# Patient Record
Sex: MALE | Race: WHITE | NOT HISPANIC OR LATINO | Employment: OTHER | URBAN - NONMETROPOLITAN AREA
[De-identification: names, ages, dates, MRNs, and addresses within clinical notes are randomized per-mention and may not be internally consistent; named-entity substitution may affect disease eponyms.]

---

## 2017-01-20 ENCOUNTER — HOSPITAL ENCOUNTER (EMERGENCY)
Facility: HOSPITAL | Age: 52
Discharge: HOME | End: 2017-01-20
Attending: EMERGENCY MEDICINE | Admitting: EMERGENCY MEDICINE
Payer: MEDICARE

## 2017-01-20 VITALS
TEMPERATURE: 98.7 F | DIASTOLIC BLOOD PRESSURE: 83 MMHG | SYSTOLIC BLOOD PRESSURE: 131 MMHG | RESPIRATION RATE: 16 BRPM | OXYGEN SATURATION: 95 % | HEART RATE: 55 BPM

## 2017-01-20 DIAGNOSIS — R07.89 ATYPICAL CHEST PAIN: ICD-10-CM

## 2017-01-20 DIAGNOSIS — K21.00 GASTROESOPHAGEAL REFLUX DISEASE WITH ESOPHAGITIS: ICD-10-CM

## 2017-01-20 LAB
ALBUMIN SERPL-MCNC: 3.9 G/DL (ref 3.4–5)
ALP SERPL-CCNC: 86 U/L (ref 40–150)
ALT SERPL W P-5'-P-CCNC: 24 U/L (ref 0–70)
ANION GAP SERPL CALCULATED.3IONS-SCNC: 6 MMOL/L (ref 3–14)
AST SERPL W P-5'-P-CCNC: 18 U/L (ref 0–45)
BASOPHILS # BLD AUTO: 0.1 10E9/L (ref 0–0.2)
BASOPHILS NFR BLD AUTO: 0.6 %
BILIRUB SERPL-MCNC: 0.3 MG/DL (ref 0.2–1.3)
BUN SERPL-MCNC: 9 MG/DL (ref 7–30)
CALCIUM SERPL-MCNC: 9.3 MG/DL (ref 8.5–10.1)
CHLORIDE SERPL-SCNC: 108 MMOL/L (ref 94–109)
CK SERPL-CCNC: 108 U/L (ref 30–300)
CO2 SERPL-SCNC: 28 MMOL/L (ref 20–32)
CREAT SERPL-MCNC: 0.97 MG/DL (ref 0.66–1.25)
DIFFERENTIAL METHOD BLD: NORMAL
EOSINOPHIL # BLD AUTO: 0.1 10E9/L (ref 0–0.7)
EOSINOPHIL NFR BLD AUTO: 1.6 %
ERYTHROCYTE [DISTWIDTH] IN BLOOD BY AUTOMATED COUNT: 13.5 % (ref 10–15)
GFR SERPL CREATININE-BSD FRML MDRD: 82 ML/MIN/1.7M2
GLUCOSE SERPL-MCNC: 98 MG/DL (ref 70–99)
HCT VFR BLD AUTO: 46.6 % (ref 40–53)
HGB BLD-MCNC: 16.1 G/DL (ref 13.3–17.7)
IMM GRANULOCYTES # BLD: 0 10E9/L (ref 0–0.4)
IMM GRANULOCYTES NFR BLD: 0.3 %
LIPASE SERPL-CCNC: 107 U/L (ref 73–393)
LYMPHOCYTES # BLD AUTO: 2.8 10E9/L (ref 0.8–5.3)
LYMPHOCYTES NFR BLD AUTO: 30.9 %
MAGNESIUM SERPL-MCNC: 2.3 MG/DL (ref 1.6–2.3)
MCH RBC QN AUTO: 33 PG (ref 26.5–33)
MCHC RBC AUTO-ENTMCNC: 34.5 G/DL (ref 31.5–36.5)
MCV RBC AUTO: 96 FL (ref 78–100)
MONOCYTES # BLD AUTO: 0.7 10E9/L (ref 0–1.3)
MONOCYTES NFR BLD AUTO: 8 %
NEUTROPHILS # BLD AUTO: 5.3 10E9/L (ref 1.6–8.3)
NEUTROPHILS NFR BLD AUTO: 58.6 %
NRBC # BLD AUTO: 0 10*3/UL
NRBC BLD AUTO-RTO: 0 /100
PLATELET # BLD AUTO: 337 10E9/L (ref 150–450)
POTASSIUM SERPL-SCNC: 4 MMOL/L (ref 3.4–5.3)
PROT SERPL-MCNC: 7.4 G/DL (ref 6.8–8.8)
RBC # BLD AUTO: 4.88 10E12/L (ref 4.4–5.9)
SODIUM SERPL-SCNC: 142 MMOL/L (ref 133–144)
TROPONIN I SERPL-MCNC: NORMAL UG/L (ref 0–0.04)
WBC # BLD AUTO: 9 10E9/L (ref 4–11)

## 2017-01-20 PROCEDURE — 83690 ASSAY OF LIPASE: CPT | Performed by: EMERGENCY MEDICINE

## 2017-01-20 PROCEDURE — 25000132 ZZH RX MED GY IP 250 OP 250 PS 637: Mod: GY | Performed by: EMERGENCY MEDICINE

## 2017-01-20 PROCEDURE — 93005 ELECTROCARDIOGRAM TRACING: CPT

## 2017-01-20 PROCEDURE — 80053 COMPREHEN METABOLIC PANEL: CPT | Performed by: EMERGENCY MEDICINE

## 2017-01-20 PROCEDURE — 82550 ASSAY OF CK (CPK): CPT | Performed by: EMERGENCY MEDICINE

## 2017-01-20 PROCEDURE — 25000125 ZZHC RX 250: Performed by: EMERGENCY MEDICINE

## 2017-01-20 PROCEDURE — 71020 ZZHC CHEST TWO VIEWS, FRONT/LAT: CPT | Mod: TC

## 2017-01-20 PROCEDURE — 99285 EMERGENCY DEPT VISIT HI MDM: CPT | Mod: 25

## 2017-01-20 PROCEDURE — 83735 ASSAY OF MAGNESIUM: CPT | Performed by: EMERGENCY MEDICINE

## 2017-01-20 PROCEDURE — 99285 EMERGENCY DEPT VISIT HI MDM: CPT | Performed by: EMERGENCY MEDICINE

## 2017-01-20 PROCEDURE — 85025 COMPLETE CBC W/AUTO DIFF WBC: CPT | Performed by: EMERGENCY MEDICINE

## 2017-01-20 PROCEDURE — 84484 ASSAY OF TROPONIN QUANT: CPT | Performed by: EMERGENCY MEDICINE

## 2017-01-20 PROCEDURE — 36415 COLL VENOUS BLD VENIPUNCTURE: CPT | Performed by: EMERGENCY MEDICINE

## 2017-01-20 PROCEDURE — 25000128 H RX IP 250 OP 636: Performed by: EMERGENCY MEDICINE

## 2017-01-20 PROCEDURE — A9270 NON-COVERED ITEM OR SERVICE: HCPCS | Mod: GY | Performed by: EMERGENCY MEDICINE

## 2017-01-20 RX ORDER — LIDOCAINE 40 MG/G
CREAM TOPICAL
Status: DISCONTINUED | OUTPATIENT
Start: 2017-01-20 | End: 2017-01-20 | Stop reason: HOSPADM

## 2017-01-20 RX ORDER — NITROGLYCERIN 0.4 MG/1
0.4 TABLET SUBLINGUAL EVERY 5 MIN PRN
Status: DISCONTINUED | OUTPATIENT
Start: 2017-01-20 | End: 2017-01-20 | Stop reason: HOSPADM

## 2017-01-20 RX ORDER — PHENOBARBITAL, HYOSCYAMINE SULFATE, ATROPINE SULFATE, SCOPOLAMINE HYDROBROMIDE .0194; .1037; 16.2; .0065 MG/5ML; MG/5ML; MG/5ML; MG/5ML
10 ELIXIR ORAL EVERY 6 HOURS
Status: DISCONTINUED | OUTPATIENT
Start: 2017-01-20 | End: 2017-01-20 | Stop reason: HOSPADM

## 2017-01-20 RX ORDER — SODIUM CHLORIDE 9 MG/ML
1000 INJECTION, SOLUTION INTRAVENOUS CONTINUOUS
Status: DISCONTINUED | OUTPATIENT
Start: 2017-01-20 | End: 2017-01-20 | Stop reason: HOSPADM

## 2017-01-20 RX ORDER — ASPIRIN 81 MG/1
324 TABLET, CHEWABLE ORAL ONCE
Status: COMPLETED | OUTPATIENT
Start: 2017-01-20 | End: 2017-01-20

## 2017-01-20 RX ORDER — FAMOTIDINE 20 MG/1
20 TABLET, FILM COATED ORAL 2 TIMES DAILY
Qty: 30 TABLET | Refills: 0 | Status: SHIPPED | OUTPATIENT
Start: 2017-01-20 | End: 2017-03-16

## 2017-01-20 RX ADMIN — PHENOBARBITAL, HYOSCYAMINE SULFATE, ATROPINE SULFATE, SCOPOLAMINE HYDROBROMIDE 32.4 MG: 16.2; .1037; .0194; .0065 ELIXIR ORAL at 15:23

## 2017-01-20 RX ADMIN — ASPIRIN 81 MG 324 MG: 81 TABLET ORAL at 14:19

## 2017-01-20 RX ADMIN — SODIUM CHLORIDE 500 ML: 9 INJECTION, SOLUTION INTRAVENOUS at 14:18

## 2017-01-20 RX ADMIN — NITROGLYCERIN 0.4 MG: 0.4 TABLET SUBLINGUAL at 14:25

## 2017-01-20 RX ADMIN — SODIUM CHLORIDE 1000 ML: 9 INJECTION, SOLUTION INTRAVENOUS at 14:18

## 2017-01-20 RX ADMIN — LIDOCAINE HYDROCHLORIDE: 20 SOLUTION ORAL; TOPICAL at 15:23

## 2017-01-20 RX ADMIN — NITROGLYCERIN 0.4 MG: 0.4 TABLET SUBLINGUAL at 14:19

## 2017-01-20 ASSESSMENT — ENCOUNTER SYMPTOMS
CHEST TIGHTNESS: 1
NERVOUS/ANXIOUS: 1
LIGHT-HEADEDNESS: 1

## 2017-01-20 NOTE — ED NOTES
"Pt presents with wife with c/o mid sternal chest pain, dizziness, and \"generalized discomfort in my trachea.\" Pt states \"it's like I swallowed something out of the norm and it keeps bubbling up and down.\" Pt states that he tried milk,antacids, and 7up without relief. Pt also vomited pta and vomited here. Pt denies headache and abd pain. Pt alert and oriented. Pt denies any new/different activities or medication changes. Pt denies increase in pain with palpation.   "

## 2017-01-20 NOTE — ED AVS SNAPSHOT
[facility]    [address]    [place] [address]    Phone:  [phone]                                       Vinny Hsu   MRN: [id number]    Department:  [facility]   Date of Visit:  1/20/2017           After Visit Summary Signature Page     I have received my discharge instructions, and my questions have been answered. I have discussed any challenges I see with this plan with the nurse or doctor.    ..........................................................................................................................................  Patient/Patient Representative Signature      ..........................................................................................................................................  Patient Representative Print Name and Relationship to Patient    ..................................................               ................................................  Date                                            Time    ..........................................................................................................................................  Reviewed by Signature/Title    ...................................................              ..............................................  Date                                                            Time

## 2017-01-20 NOTE — DISCHARGE INSTRUCTIONS
*CHEST PAIN, UNCERTAIN CAUSE    Based on your exam today, the exact cause of your chest pain is not certain. Your condition does not seem serious at this time, and your pain does not appear to be coming from your heart. However, sometimes the signs of a serious problem take more time to appear. Therefore, watch for the warning signs listed below.  HOME CARE:    Rest today and avoid strenuous activity.    Take any prescribed medicine as directed.  FOLLOW UP with your doctor in 1-3 days.   GET PROMPT MEDICAL ATTENTION if any of the following occur:    A change in the type of pain: if it feels different, becomes more severe, lasts longer, or begins to spread into your shoulder, arm, neck, jaw or back    Shortness of breath or increased pain with breathing    Weakness, dizziness, or fainting    Cough with blood or dark colored sputum (phlegm)    Fever over 101  F (38.3  C)    Swelling, pain or redness in one leg    2309-6541 [place] [address]. All rights reserved. This information is not intended as a substitute for professional medical care. Always follow your healthcare professional's instructions.      What Is Acid Reflux?    Do you have to clear your throat or cough often? Are you hoarse? Do you have difficulty swallowing? If you have these or other throat symptoms, you may have acid reflux (when stomach acid washes up and irritates your throat).  Why You Have Throat Symptoms  At both ends of the esophagus (the tube that carries food to the stomach) are the esophageal sphincters. These muscles relax to let food pass, then tighten to keep stomach acid down. When the lower esophageal sphincter (LES) doesn t tighten enough, acid can reflux from the stomach into the esophagus. This may cause heartburn. If the upper esophageal sphincter (UES) also doesn t work well, acid can travel higher and enter your throat (pharynx). In many cases, this causes throat symptoms.  Common  Throat Symptoms    Frequent need to clear your throat    Feeling like you re choking    Chronic cough    Hoarseness    Trouble swallowing    Sensation of having  a lump in the throat     Sour or acid taste    Recurrent sore throat     9517-0839 [employer]. [address] [place] [address]. All rights reserved. This information is not intended as a substitute for professional medical care. Always follow your healthcare professional's instructions.          Medications for Acid Reflux  Your health care provider has told you that you have acid reflux. This is a condition that causes stomach acid to wash up into your throat. For most people, acid reflux is troubling but not dangerous. However, left untreated, acid reflux sometimes damages the esophagus. Medications can help control acid reflux and limit your risk of future problems.  Medications for Acid Reflux  Your health care provider may prescribe medication to help treat your acid reflux. Medication will be based on your symptoms and the results of any tests. Your health care provider will explain how to take your medication. You will also be told about possible side effects.  Reducing Stomach Acid  Your doctor may suggest antacids that you can buy over the counter. Antacids can give fast relief. Or you may be told to take a type of medication called H2 blockers. These are available over the counter and by prescription (for higher doses).  Blocking Stomach Acid  In more severe cases, your doctor may suggest stronger medications such as proton pump inhibitors (PPIs). These keep the stomach from making acid. They are often prescribed for long-term use.  Other Medications  If medications to reduce or block stomach acid don t work, you may be switched to another type of medication that helps the stomach empty better.    6609-8803 [employer]. [address] [place] [address]. All rights reserved. This information is not  "intended as a substitute for professional medical care. Always follow your healthcare professional's instructions.          Tips to Control Acid Reflux  To control acid reflux, you ll need to make some basic diet and lifestyle changes. The simple steps outlined below may be all you ll need to relieve discomfort.  Watch What You Eat      Avoid fatty foods and spicy foods.    Eat fewer acidic foods, such as citrus and tomato-based foods. These can increase symptoms.    Limit drinking alcohol, caffeine, and fizzy beverages. All increase acid reflux.    Try limiting chocolate, peppermint, and spearmint. These can worsen acid reflux in some people.  Watch When You Eat    Avoid lying down for 3 hours after eating.    Do not snack before going to bed.  Raise Your Head    Raising your head and upper body by 4 inches to 6 inches helps limit reflux when you re lying down. Put blocks under the head of the bed frame to raise it.  Other Changes    Lose weight, if you need to.    Don t work out near bedtime.    Avoid tight-fitting clothes.    Limit aspirin and ibuprofen.    Stop smoking.     9329-0400 [employer]. [address] [place] [address]. All rights reserved. This information is not intended as a substitute for professional medical care. Always follow your healthcare professional's instructions.      Vinny,  You had a chest pain that certainly seemed like it could be \"heartburn\" more properly known as GERD (gastroesophageal reflux) that can cause your symptoms.  Of course, we always assume that this is due to your heart but the workup in the ER here for that was negative.   Take the pepcid twice per day, avoid spicy or tomato based foods.  Followup with Charleen when you get connected or if this should get worse come on back to the ED.  Good luck!  "

## 2017-01-20 NOTE — ED PROVIDER NOTES
History     Chief Complaint   Patient presents with     Chest Pain     c/o midsternal chest pain x 1 hour, notes trying to nap when pain started.     HPI  Vinny Hsu is a 51 year old male with the above symptoms.  Even though he has a (+) family hx of heart disease, he has no known cardiac issues.  Today's pain started after a midday nap with him trying a variety of antacids, crackers and the like.  The pain seemed to settle somewhat--it had actually made it all the way to his upper sternal area.     I have reviewed the Medications, Allergies, Past Medical and Surgical History, and Social History in the Epic system.    Review of Systems   Respiratory: Positive for chest tightness.    Cardiovascular: Positive for chest pain.   Neurological: Positive for light-headedness.   Psychiatric/Behavioral: The patient is nervous/anxious.    All other systems reviewed and are negative.      Physical Exam   BP: 151/100 mmHg  Heart Rate: 68  Temp: 97.7  F (36.5  C)  Resp: 20  SpO2: 99 %  Physical Exam   Constitutional: He appears well-developed and well-nourished. No distress.   Mildly disheveled mustachioed fellow who has a quirky mildly sarcastic sense of humor.    HENT:   Head: Normocephalic and atraumatic.   Right Ear: External ear normal.   Left Ear: External ear normal.   Eyes: Conjunctivae and EOM are normal. Pupils are equal, round, and reactive to light.   Neck: Normal range of motion. Neck supple.   Cardiovascular: Normal rate and regular rhythm.    Pulmonary/Chest: Effort normal and breath sounds normal. No respiratory distress. He has no wheezes. He has no rales.   Abdominal: Soft. Bowel sounds are normal. He exhibits no distension. There is no tenderness. There is no rebound.   Musculoskeletal: Normal range of motion.   Neurological: He is alert.   Skin: Skin is warm. He is not diaphoretic.     ED Course   Procedures  ECG  Normal sinus rhythm, QTc 431 ms  Critical Care time:  none    Labs Ordered and  Resulted from Time of ED Arrival Up to the Time of Departure from the ED   CBC WITH PLATELETS DIFFERENTIAL   COMPREHENSIVE METABOLIC PANEL   MAGNESIUM   LIPASE   TROPONIN I   CK TOTAL   PERIPHERAL IV CATHETER     Assessments & Plan (with Medical Decision Making)   Vinny has chest pain that seems most likely to be GERD with some esophageal spasm but was evaluated for ischemic based pain.  IV was placed and labs obtained.  ASA 325mg given with 2 nitro 0.4mg sl with no effect.  Labs were all wnl along with CXR which also was normal.  1L NS bolus/infusion given followed by the nitro.  GI cocktail seemed to be effective suggesitng that GERD was probably at work here.  Advised re diet and given the H2 antagonist below.   I have reviewed the nursing notes.    I have reviewed the findings, diagnosis, plan and need for follow up with the patient.    New Prescriptions    FAMOTIDINE (PEPCID) 20 MG TABLET    Take 1 tablet (20 mg) by mouth 2 times daily       Final diagnoses:   Atypical chest pain   Gastroesophageal reflux disease with esophagitis       1/20/2017   [facility]      Moses Irizarry MD  01/20/17 5213

## 2017-01-20 NOTE — ED NOTES
Discharge instructions reviewed with patient, and patient verbalized understanding. Rx for pepcid 20mg BID called into [facility]s [facility] clinic. Pt ambulated with a steady gait to the exit.

## 2017-01-20 NOTE — ED AVS SNAPSHOT
[facility]    [address]    [place] [address]    Phone:  [phone]                                       Vinny Hsu   MRN: [id number]    Department:  [facility]   Date of Visit:  1/20/2017           Patient Information     Date Of Birth          1965        Your diagnoses for this visit were:     Atypical chest pain     Gastroesophageal reflux disease with esophagitis        You were seen by Moses Irizarry MD.      Follow-up Information     Follow up with None.    Why:  As needed        Discharge Instructions          *CHEST PAIN, UNCERTAIN CAUSE    Based on your exam today, the exact cause of your chest pain is not certain. Your condition does not seem serious at this time, and your pain does not appear to be coming from your heart. However, sometimes the signs of a serious problem take more time to appear. Therefore, watch for the warning signs listed below.  HOME CARE:    Rest today and avoid strenuous activity.    Take any prescribed medicine as directed.  FOLLOW UP with your doctor in 1-3 days.   GET PROMPT MEDICAL ATTENTION if any of the following occur:    A change in the type of pain: if it feels different, becomes more severe, lasts longer, or begins to spread into your shoulder, arm, neck, jaw or back    Shortness of breath or increased pain with breathing    Weakness, dizziness, or fainting    Cough with blood or dark colored sputum (phlegm)    Fever over 101  F (38.3  C)    Swelling, pain or redness in one leg    9070-6368 [place] [address]. All rights reserved. This information is not intended as a substitute for professional medical care. Always follow your healthcare professional's instructions.      What Is Acid Reflux?    Do you have to clear your throat or cough often? Are you hoarse? Do you have difficulty swallowing? If you have these or other throat symptoms, you may have acid reflux (when stomach  acid washes up and irritates your throat).  Why You Have Throat Symptoms  At both ends of the esophagus (the tube that carries food to the stomach) are the esophageal sphincters. These muscles relax to let food pass, then tighten to keep stomach acid down. When the lower esophageal sphincter (LES) doesn t tighten enough, acid can reflux from the stomach into the esophagus. This may cause heartburn. If the upper esophageal sphincter (UES) also doesn t work well, acid can travel higher and enter your throat (pharynx). In many cases, this causes throat symptoms.  Common Throat Symptoms    Frequent need to clear your throat    Feeling like you re choking    Chronic cough    Hoarseness    Trouble swallowing    Sensation of having  a lump in the throat     Sour or acid taste    Recurrent sore throat     0140-1833 [employer]. [address] [place] [address], [place] [address]. All rights reserved. This information is not intended as a substitute for professional medical care. Always follow your healthcare professional's instructions.          Medications for Acid Reflux  Your health care provider has told you that you have acid reflux. This is a condition that causes stomach acid to wash up into your throat. For most people, acid reflux is troubling but not dangerous. However, left untreated, acid reflux sometimes damages the esophagus. Medications can help control acid reflux and limit your risk of future problems.  Medications for Acid Reflux  Your health care provider may prescribe medication to help treat your acid reflux. Medication will be based on your symptoms and the results of any tests. Your health care provider will explain how to take your medication. You will also be told about possible side effects.  Reducing Stomach Acid  Your doctor may suggest antacids that you can buy over the counter. Antacids can give fast relief. Or you may be told to take a type of medication called H2 blockers. These are  available over the counter and by prescription (for higher doses).  Blocking Stomach Acid  In more severe cases, your doctor may suggest stronger medications such as proton pump inhibitors (PPIs). These keep the stomach from making acid. They are often prescribed for long-term use.  Other Medications  If medications to reduce or block stomach acid don t work, you may be switched to another type of medication that helps the stomach empty better.    0138-5511 [employer]. [address] [place] [address]. All rights reserved. This information is not intended as a substitute for professional medical care. Always follow your healthcare professional's instructions.          Tips to Control Acid Reflux  To control acid reflux, you ll need to make some basic diet and lifestyle changes. The simple steps outlined below may be all you ll need to relieve discomfort.  Watch What You Eat      Avoid fatty foods and spicy foods.    Eat fewer acidic foods, such as citrus and tomato-based foods. These can increase symptoms.    Limit drinking alcohol, caffeine, and fizzy beverages. All increase acid reflux.    Try limiting chocolate, peppermint, and spearmint. These can worsen acid reflux in some people.  Watch When You Eat    Avoid lying down for 3 hours after eating.    Do not snack before going to bed.  Raise Your Head    Raising your head and upper body by 4 inches to 6 inches helps limit reflux when you re lying down. Put blocks under the head of the bed frame to raise it.  Other Changes    Lose weight, if you need to.    Don t work out near bedtime.    Avoid tight-fitting clothes.    Limit aspirin and ibuprofen.    Stop smoking.     1740-3768 [employer]. [address] [place] [address]. All rights reserved. This information is not intended as a substitute for professional medical care. Always follow your healthcare professional's instructions.      Vinny,  You had a chest pain  "that certainly seemed like it could be \"heartburn\" more properly known as GERD (gastroesophageal reflux) that can cause your symptoms.  Of course, we always assume that this is due to your heart but the workup in the ER here for that was negative.   Take the pepcid twice per day, avoid spicy or tomato based foods.  Followup with Charleen when you get connected or if this should get worse come on back to the ED.  Good luck!       Review of your medicines      START taking        Dose / Directions Last dose taken    famotidine 20 MG tablet   Commonly known as:  PEPCID   Dose:  20 mg   Quantity:  30 tablet        Take 1 tablet (20 mg) by mouth 2 times daily   Refills:  0          Our records show that you are taking the medicines listed below. If these are incorrect, please call your family doctor or clinic.        Dose / Directions Last dose taken    LIPITOR PO        Take by mouth daily   Refills:  0        NEURONTIN PO   Dose:  300 mg        Take 300 mg by mouth 2 times daily   Refills:  0        TRAZODONE HCL PO        Refills:  0        WELLBUTRIN PO        Refills:  0        ZOLOFT PO        Take by mouth daily   Refills:  0                Prescriptions were sent or printed at these locations (1 Prescription)                   Other Prescriptions                Printed at Department/Unit printer (1 of 1)         famotidine (PEPCID) 20 MG tablet                Procedures and tests performed during your visit     CBC with platelets differential    CK total    Comprehensive metabolic panel    Lipase    Magnesium    Peripheral IV catheter    Troponin I    XR Chest 2 Views      Orders Needing Specimen Collection     None      Pending Results     No orders found from 1/19/2017 to 1/21/2017.            Pending Culture Results     No orders found from 1/19/2017 to 1/21/2017.            Thank you for choosing [facility]       Thank you for choosing [facility] for your care. Our goal is always to provide you with excellent care. " "Hearing back from our patients is one way we can continue to improve our services. Please take a few minutes to complete the written survey that you may receive in the mail after you visit with us. Thank you!        [employer] Information     [employer] lets you send messages to your doctor, view your test results, renew your prescriptions, schedule appointments and more. To sign up, go to [email or web address] . Click on \"Log in\" on the left side of the screen, which will take you to the Welcome page. Then click on \"Sign up Now\" on the right side of the page.     You will be asked to enter the access code listed below, as well as some personal information. Please follow the directions to create your username and password.     Your access code is: [email or web address]  Expires: 2017  4:03 PM     Your access code will  in 90 days. If you need help or a new code, please call your [facility] or [phone].        Care EveryWhere ID     This is your Care EveryWhere ID. This could be used by other organizations to access your [facility] medical records  [id number]        After Visit Summary       This is your record. Keep this with you and show to your community pharmacist(s) and doctor(s) at your next visit.                  "

## 2017-03-16 ENCOUNTER — OFFICE VISIT (OUTPATIENT)
Dept: PEDIATRICS | Facility: OTHER | Age: 52
End: 2017-03-16
Attending: INTERNAL MEDICINE
Payer: MEDICARE

## 2017-03-16 VITALS
SYSTOLIC BLOOD PRESSURE: 126 MMHG | DIASTOLIC BLOOD PRESSURE: 82 MMHG | RESPIRATION RATE: 18 BRPM | OXYGEN SATURATION: 97 % | TEMPERATURE: 97.2 F | HEART RATE: 72 BPM | WEIGHT: 178 LBS | BODY MASS INDEX: 26.98 KG/M2 | HEIGHT: 68 IN

## 2017-03-16 DIAGNOSIS — F39 MOOD DISORDER (H): ICD-10-CM

## 2017-03-16 DIAGNOSIS — E03.9 ACQUIRED HYPOTHYROIDISM: ICD-10-CM

## 2017-03-16 DIAGNOSIS — Z12.11 COLON CANCER SCREENING: Primary | ICD-10-CM

## 2017-03-16 DIAGNOSIS — Z12.5 SCREENING FOR PROSTATE CANCER: ICD-10-CM

## 2017-03-16 DIAGNOSIS — Z72.0 TOBACCO ABUSE: ICD-10-CM

## 2017-03-16 DIAGNOSIS — E78.2 MIXED HYPERLIPIDEMIA: ICD-10-CM

## 2017-03-16 DIAGNOSIS — F33.1 MAJOR DEPRESSIVE DISORDER, RECURRENT EPISODE, MODERATE (H): ICD-10-CM

## 2017-03-16 DIAGNOSIS — F17.290 NICOTINE DEPENDENCE, OTHER TOBACCO PRODUCT, UNCOMPLICATED: ICD-10-CM

## 2017-03-16 DIAGNOSIS — G47.00 PERSISTENT INSOMNIA: ICD-10-CM

## 2017-03-16 PROCEDURE — 99202 OFFICE O/P NEW SF 15 MIN: CPT | Performed by: INTERNAL MEDICINE

## 2017-03-16 PROCEDURE — 99203 OFFICE O/P NEW LOW 30 MIN: CPT

## 2017-03-16 PROCEDURE — 99213 OFFICE O/P EST LOW 20 MIN: CPT

## 2017-03-16 RX ORDER — GABAPENTIN 300 MG/1
CAPSULE ORAL
Qty: 270 CAPSULE | Refills: 3 | Status: SHIPPED | OUTPATIENT
Start: 2017-03-16 | End: 2017-06-15

## 2017-03-16 RX ORDER — TRAZODONE HYDROCHLORIDE 50 MG/1
50 TABLET, FILM COATED ORAL AT BEDTIME
Qty: 90 TABLET | Refills: 3 | Status: SHIPPED | OUTPATIENT
Start: 2017-03-16 | End: 2017-06-15

## 2017-03-16 RX ORDER — SERTRALINE HYDROCHLORIDE 100 MG/1
100 TABLET, FILM COATED ORAL DAILY
Qty: 180 TABLET | Refills: 3 | Status: SHIPPED | OUTPATIENT
Start: 2017-03-16 | End: 2017-04-04

## 2017-03-16 RX ORDER — BUPROPION HYDROCHLORIDE 150 MG/1
150 TABLET ORAL DAILY
Qty: 90 TABLET | Refills: 5 | Status: SHIPPED | OUTPATIENT
Start: 2017-03-16 | End: 2017-06-15

## 2017-03-16 ASSESSMENT — PATIENT HEALTH QUESTIONNAIRE - PHQ9: 5. POOR APPETITE OR OVEREATING: SEVERAL DAYS

## 2017-03-16 ASSESSMENT — ANXIETY QUESTIONNAIRES
2. NOT BEING ABLE TO STOP OR CONTROL WORRYING: MORE THAN HALF THE DAYS
IF YOU CHECKED OFF ANY PROBLEMS ON THIS QUESTIONNAIRE, HOW DIFFICULT HAVE THESE PROBLEMS MADE IT FOR YOU TO DO YOUR WORK, TAKE CARE OF THINGS AT HOME, OR GET ALONG WITH OTHER PEOPLE: SOMEWHAT DIFFICULT
5. BEING SO RESTLESS THAT IT IS HARD TO SIT STILL: NOT AT ALL
3. WORRYING TOO MUCH ABOUT DIFFERENT THINGS: MORE THAN HALF THE DAYS
1. FEELING NERVOUS, ANXIOUS, OR ON EDGE: MORE THAN HALF THE DAYS
6. BECOMING EASILY ANNOYED OR IRRITABLE: MORE THAN HALF THE DAYS
GAD7 TOTAL SCORE: 10
7. FEELING AFRAID AS IF SOMETHING AWFUL MIGHT HAPPEN: SEVERAL DAYS

## 2017-03-16 ASSESSMENT — ENCOUNTER SYMPTOMS
SHORTNESS OF BREATH: 0
CHILLS: 0
CONSTIPATION: 0
COUGH: 0
BLOOD IN STOOL: 0
BLURRED VISION: 1
DIZZINESS: 0
MYALGIAS: 0
VOMITING: 0
PALPITATIONS: 1
INSOMNIA: 1
ABDOMINAL PAIN: 0
HEARTBURN: 0
DYSURIA: 0
DIARRHEA: 0
DOUBLE VISION: 0
WHEEZING: 0
NAUSEA: 0
FEVER: 0
TREMORS: 0
HEADACHES: 0
HEMATURIA: 0

## 2017-03-16 ASSESSMENT — PAIN SCALES - GENERAL: PAINLEVEL: NO PAIN (0)
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
## 2017-03-16 NOTE — MR AVS SNAPSHOT
After Visit Summary   3/16/2017    Vinny Hsu    MRN: [id number]           Patient Information     Date Of Birth          1965        Visit Information        Provider Department      3/16/2017 9:40 AM Elijah Villaseñor DO [facility]        Today's Diagnoses     Colon cancer screening    -  1    Mixed hyperlipidemia        Acquired hypothyroidism        Nicotine dependence, other tobacco product, uncomplicated         Major depressive disorder, recurrent episode, moderate (H)        Mood disorder (H)        Persistent insomnia        Screening for prostate cancer           Follow-ups after your visit        Additional Services     GENERAL SURG ADULT REFERRAL       Your provider has referred you to: PREFERRED PROVIDERS: [facility]    Please be aware that coverage of these services is subject to the terms and limitations of your health insurance plan.  Call member services at your health plan with any benefit or coverage questions.      Please bring the following with you to your appointment:    (1) Any X-Rays, CTs or MRIs which have been performed.  Contact the facility where they were done to arrange for  prior to your scheduled appointment.   (2) List of current medications   (3) This referral request   (4) Any documents/labs given to you for this referral                  Follow-up notes from your care team     Return in about 2 months (around 5/16/2017), or Annual physical.      Your next 10 appointments already scheduled     May 18, 2017  1:20 PM CDT   (Arrive by 1:00 PM)   SHORT with DO Linden Esparza ([facility])    [address] [place] [address]   [phone]              Future tests that were ordered for you today     Open Future Orders        Priority Expected Expires Ordered    PSA, screen Routine  4/30/2017 3/16/2017    Comprehensive metabolic panel (BMP + Alb, Alk Phos, ALT, AST,  "Total. Bili, TP) Routine 3/16/2017 2017 3/16/2017    TSH Routine 3/16/2017 2017 3/16/2017    T4, free Routine 3/16/2017 2017 3/16/2017    CBC with platelets Routine 3/16/2017 2017 3/16/2017    Lipid Profile (Chol, Trig, HDL, LDL calc) Routine 3/16/2017 2017 3/16/2017            Who to contact     If you have questions or need follow up information about today's clinic visit or your schedule please contact [facility] [place] directly at [phone].  Normal or non-critical lab and imaging results will be communicated to you by MyChart, letter or phone within 4 business days after the clinic has received the results. If you do not hear from us within 7 days, please contact the clinic through MyChart or phone. If you have a critical or abnormal lab result, we will notify you by phone as soon as possible.  Submit refill requests through [employer] or call your pharmacy and they will forward the refill request to us. Please allow 3 business days for your refill to be completed.          Additional Information About Your Visit        MyChart Information     [employer] lets you send messages to your doctor, view your test results, renew your prescriptions, schedule appointments and more. To sign up, go to [email or web address] . Click on \"Log in\" on the left side of the screen, which will take you to the Welcome page. Then click on \"Sign up Now\" on the right side of the page.     You will be asked to enter the access code listed below, as well as some personal information. Please follow the directions to create your username and password.     Your access code is: [email or web address]  Expires: 2017  5:03 PM     Your access code will  in 90 days. If you need help or a new code, please call your [facility] clinic or [phone].        Care EveryWhere ID     This is your Care EveryWhere ID. This could be used by other organizations to access your [facility] medical records  [id number]      " "  Your Vitals Were     Pulse Temperature Respirations Height Pulse Oximetry BMI (Body Mass Index)    72 97.2  F (36.2  C) 18 5' 7.75\" (1.721 m) 97% 27.26 kg/m2       Blood Pressure from Last 3 Encounters:   03/16/17 126/82   01/20/17 131/83    Weight from Last 3 Encounters:   03/16/17 178 lb (80.7 kg)              We Performed the Following     GENERAL SURG ADULT REFERRAL          Today's Medication Changes          These changes are accurate as of: 3/16/17 10:42 AM.  If you have any questions, ask your nurse or doctor.               These medicines have changed or have updated prescriptions.        Dose/Directions    buPROPion 150 MG 24 hr tablet   Commonly known as:  WELLBUTRIN XL   Indication:  Major Depressive Disorder   This may have changed:  Another medication with the same name was removed. Continue taking this medication, and follow the directions you see here.   Used for:  Major depressive disorder, recurrent episode, moderate (H), Mood disorder (H)   Changed by:  Elijah Villaseñor DO        Dose:  150 mg   Take 1 tablet (150 mg) by mouth daily   Quantity:  90 tablet   Refills:  5       gabapentin 300 MG capsule   Commonly known as:  NEURONTIN   Indication:  Social Anxiety Disorder   This may have changed:    - medication strength  - how much to take  - how to take this  - when to take this   Used for:  Mood disorder (H)   Changed by:  Elijah Villaseñor DO        ONE CAP Q AM, TWO CAPS QHS   Quantity:  270 capsule   Refills:  3       sertraline 100 MG tablet   Commonly known as:  ZOLOFT   Indication:  Anxiety Disorder, Major Depressive Disorder   This may have changed:  medication strength   Used for:  Major depressive disorder, recurrent episode, moderate (H)   Changed by:  Elijah Villaseñor DO        Dose:  100 mg   Take 1 tablet (100 mg) by mouth daily TWO TABS DAILY   Quantity:  180 tablet   Refills:  3         Stop taking these medicines if you haven't already. Please contact your " care team if you have questions.     famotidine 20 MG tablet   Commonly known as:  PEPCID   Stopped by:  Elijah Villaseñor, DO                Where to get your medicines      These medications were sent to [facility] - [address]  [address] [place] [address]     Phone:  [phone]     buPROPion 150 MG 24 hr tablet    gabapentin 300 MG capsule    sertraline 100 MG tablet    traZODone 50 MG tablet                Primary Care Provider    None       No address on file        Thank you!     Thank you for [facility]  [facility]. Our goal is always to provide you with excellent care. Hearing back from our patients is one way we can continue to improve our services. Please take a few minutes to complete the written survey that you may receive in the mail after your visit with us. Thank you!             Your Updated Medication List - Protect others around you: Learn how to safely use, store and throw away your medicines at www.disposemymeds.org.          This list is accurate as of: 3/16/17 10:42 AM.  Always use your most recent med list.                   Brand Name Dispense Instructions for use    buPROPion 150 MG 24 hr tablet    WELLBUTRIN XL    90 tablet    Take 1 tablet (150 mg) by mouth daily       BUSPIRONE HCL PO      Take 30 mg by mouth 2 times daily       gabapentin 300 MG capsule    NEURONTIN    270 capsule    ONE CAP Q AM, TWO CAPS QHS       LEVOTHYROXINE SODIUM PO      Take 88 mcg by mouth daily       LIPITOR PO      Take 20 mg by mouth daily       sertraline 100 MG tablet    ZOLOFT    180 tablet    Take 1 tablet (100 mg) by mouth daily TWO TABS DAILY       traZODone 50 MG tablet    DESYREL    90 tablet    Take 1 tablet (50 mg) by mouth At Bedtime MAY TAKE ADDITIONAL DOSE AS NEEDED

## 2017-03-16 NOTE — NURSING NOTE
"Chief Complaint   Patient presents with     Establish Care       Initial /82 (BP Location: Left arm, Patient Position: Chair)  Pulse 72  Temp 97.2  F (36.2  C)  Resp 18  Ht 5' 7.75\" (1.721 m)  Wt 178 lb (80.7 kg)  SpO2 97%  BMI 27.26 kg/m2 Estimated body mass index is 27.26 kg/(m^2) as calculated from the following:    Height as of this encounter: 5' 7.75\" (1.721 m).    Weight as of this encounter: 178 lb (80.7 kg).  Medication Reconciliation: complete     Declined HCP  Would like to update tdap/csope    Neena Weinberg      "

## 2017-03-17 ASSESSMENT — ANXIETY QUESTIONNAIRES: GAD7 TOTAL SCORE: 10

## 2017-03-17 ASSESSMENT — PATIENT HEALTH QUESTIONNAIRE - PHQ9: SUM OF ALL RESPONSES TO PHQ QUESTIONS 1-9: 9

## 2017-04-04 RX ORDER — SERTRALINE HYDROCHLORIDE 100 MG/1
200 TABLET, FILM COATED ORAL DAILY
Qty: 180 TABLET | Refills: 3 | COMMUNITY
Start: 2017-04-04 | End: 2017-05-18

## 2017-04-05 NOTE — PATIENT INSTRUCTIONS
HOW TO QUIT SMOKING  Smoking is one of the hardest habits to break. About half of all those who have ever smoked have been able to quit, and most of those (about 70%) who still smoke want to quit. Here are some of the best ways to stop smoking.     KEEP TRYING:  It takes most smokers about 8 tries before they are finally able to fully quit. So, the more often you try and fail, the better your chance of quitting the next time! So, don't give up!    GO COLD TURKEY:  Most ex-smokers quit cold turkey. Trying to cut back gradually doesn't seem to work as well, perhaps because it continues the smoking habit. Also, it is possible to fool yourself by inhaling more while smoking fewer cigarettes. This results in the same amount of nicotine in your body!    GET SUPPORT:  Support programs can make an important difference, especially for the heavy smoker. These groups offer lectures, methods to change your behavior and peer support. Call the free national Quitline for more information. 800-QUIT-NOW (181-565-9212). Low-cost or free programs are offered by many hospitals, local chapters of the American Lung Association (453-482-0501) and the American Cancer Society (367-351-8854). Support at home is important too. Non-smokers can help by offering praise and encouragement. If the smoker fails to quit, encourage them to try again!    OVER-THE-COUNTER MEDICINES:  For those who can't quit on their own, Nicotine Replacement Therapy (NRT) may make quitting much easier. Certain aids such as the nicotine patch, gum and lozenge are available without a prescription. However, it is best to use these under the guidance of your doctor. The skin patch provides a steady supply of nicotine to the body. Nicotine gum and lozenge gives temporary bursts of low levels of nicotine. Both methods take the edge off the craving for cigarettes. WARNING: If you feel symptoms of nicotine overdose, such as nausea, vomiting, dizziness, weakness, or fast  heartbeat, stop using these and see your doctor.    PRESCRIPTION MEDICINES:  After evaluating your smoking patterns and prior attempts at quitting, your doctor may offer a prescription medicine such as bupropion (Zyban, Wellbutrin), varenicline (Chantix, Champix), a niocotine inhaler or nasal spray. Each has its unique advantage and side effects which your doctor can review with you.    HEALTH BENEFITS OF QUITTING:  The benefits of quitting start right away and keep improving the longer you go without smokin minutes: blood pressure and pulse return to normal  8 hours: oxygen levels return to normal  2 days: ability to smell and taste begins to improve as damaged nerves start to regrow  2-3 weeks: circulation and lung function improves  1-9 months: decreased cough, congestion and shortness of breath; less tired  1 year: risk of heart attack decreases by half  5 years: risk of lung cancer decreases by half; risk of stroke becomes the same as a non-smoker  For information about how to quit smoking, visit the following links:  National Cancer [place] ,   Clearing the Air, Quit Smoking Today   - an online booklet. http://www.smokefree.gov/pubs/clearing_the_air.pdf  Smokefree.gov http://smokefree.gov/  QuitNet http://www.quitnet.com/    4265-6095 Gil Butler, [address] [place] [address], [place] [address]. All rights reserved. This information is not intended as a substitute for professional medical care. Always follow your healthcare professional's instructions.

## 2017-05-18 ENCOUNTER — MEDICAL CORRESPONDENCE (OUTPATIENT)
Dept: HEALTH INFORMATION MANAGEMENT | Facility: CLINIC | Age: 52
End: 2017-05-18

## 2017-05-18 ENCOUNTER — OFFICE VISIT (OUTPATIENT)
Dept: PEDIATRICS | Facility: OTHER | Age: 52
End: 2017-05-18
Attending: INTERNAL MEDICINE
Payer: MEDICARE

## 2017-05-18 VITALS — SYSTOLIC BLOOD PRESSURE: 110 MMHG | TEMPERATURE: 97 F | HEART RATE: 72 BPM | DIASTOLIC BLOOD PRESSURE: 70 MMHG

## 2017-05-18 DIAGNOSIS — F39 MOOD DISORDER (H): Primary | ICD-10-CM

## 2017-05-18 DIAGNOSIS — F33.1 MAJOR DEPRESSIVE DISORDER, RECURRENT EPISODE, MODERATE (H): ICD-10-CM

## 2017-05-18 PROCEDURE — 99212 OFFICE O/P EST SF 10 MIN: CPT

## 2017-05-18 PROCEDURE — 99213 OFFICE O/P EST LOW 20 MIN: CPT | Performed by: INTERNAL MEDICINE

## 2017-05-18 ASSESSMENT — ANXIETY QUESTIONNAIRES
IF YOU CHECKED OFF ANY PROBLEMS ON THIS QUESTIONNAIRE, HOW DIFFICULT HAVE THESE PROBLEMS MADE IT FOR YOU TO DO YOUR WORK, TAKE CARE OF THINGS AT HOME, OR GET ALONG WITH OTHER PEOPLE: SOMEWHAT DIFFICULT
3. WORRYING TOO MUCH ABOUT DIFFERENT THINGS: NOT AT ALL
7. FEELING AFRAID AS IF SOMETHING AWFUL MIGHT HAPPEN: SEVERAL DAYS
6. BECOMING EASILY ANNOYED OR IRRITABLE: SEVERAL DAYS
1. FEELING NERVOUS, ANXIOUS, OR ON EDGE: NOT AT ALL
5. BEING SO RESTLESS THAT IT IS HARD TO SIT STILL: NOT AT ALL
2. NOT BEING ABLE TO STOP OR CONTROL WORRYING: NOT AT ALL
GAD7 TOTAL SCORE: 2

## 2017-05-18 ASSESSMENT — PATIENT HEALTH QUESTIONNAIRE - PHQ9: 5. POOR APPETITE OR OVEREATING: NOT AT ALL

## 2017-05-18 ASSESSMENT — PAIN SCALES - GENERAL: PAINLEVEL: NO PAIN (1)

## 2017-05-18 NOTE — PROGRESS NOTES
SUBJECTIVE:                                                    Vinny Hsu is a 51 year old male who presents to clinic today for the following health issues:      HPI      Depression and Anxiety Follow-Up    Status since last visit: No change, he feels that he has anger outbursts.    Other associated symptoms:None    Complicating factors:     Significant life event: No     Current substance abuse: None    PHQ-9 SCORE 3/16/2017 5/18/2017   Total Score 9 6     MARY-7 SCORE 3/16/2017 5/18/2017   Total Score 10 2        PHQ-9  English      PHQ-9   Any Language     GAD7       Problem list and histories reviewed & adjusted, as indicated.  Additional history: as documented    Right inguinal pain:  He reports that he has pain when walking for a prolonged time.  He reports twinge like pain ins the groin.  He denies any burning pain.    There is no problem list on file for this patient.    Past Surgical History:   Procedure Laterality Date     DENTAL SURGERY Bilateral     ALL UPPER TEETH GONE       Social History   Substance Use Topics     Smoking status: Current Every Day Smoker     Types: Cigars     Smokeless tobacco: Not on file     Alcohol use Yes     Family History   Problem Relation Age of Onset     Other Cancer Mother      Coronary Artery Disease Paternal Grandfather            ROS:  C: NEGATIVE for fever, chills, change in weight  R: NEGATIVE for significant cough or SOB  CV: NEGATIVE for chest pain, palpitations or peripheral edema  GI: NEGATIVE for nausea, abdominal pain, heartburn, or change in bowel habits  : NEGATIVE for frequency, dysuria, or hematuria  MUSCULOSKELETAL:See HPI  N: NEGATIVE for weakness, dizziness or paresthesias  PSYCHIATRIC: See HPI    OBJECTIVE:                                                    /70  Pulse 72  Temp 97  F (36.1  C)  There is no height or weight on file to calculate BMI.  GENERAL: healthy, alert and no distress  RESP: lungs clear to auscultation - no rales,  rhonchi or wheezes  CV: regular rate and rhythm, normal S1 S2, no S3 or S4, no murmur, click or rub, no peripheral edema and peripheral pulses strong  ABDOMEN: soft, nontender, no hepatosplenomegaly, no masses and bowel sounds normal  ABDOMEN: no bruits heard and no inguinal hernias  MS: no gross musculoskeletal defects noted, no edema  PSYCH: affect flat and anxious    Diagnostic Test Results:  none      ASSESSMENT/PLAN:                                                    (F39) Mood disorder (H)  (primary encounter diagnosis)  Comment: His depression is well controlled, but he reports moodiness episodes   Plan:   Start  lamoTRIgine (LAMICTAL) 25 MG tablet, increase dosage by doubling dose every 10 days.  He will continue Zoloft 200 mg and Wellbutrin 150 mg daily.    (F33.1) Major depressive disorder, recurrent episode, moderate (H)  Comment:   Plan:   sertraline (ZOLOFT) 100 MG tablet, 200 mg daily and Wellbutrin 150 mg daily.            FUTURE APPOINTMENTS:       - Follow-up visit in  1 month     Elijah Martin Villaseñor DO, DO  [facility] [place]

## 2017-05-18 NOTE — MR AVS SNAPSHOT
After Visit Summary   5/18/2017    Vinny Hsu    MRN: [id number]           Patient Information     Date Of Birth          1965        Visit Information        Provider Department      5/18/2017 1:20 PM Elijah Villaseñor DO [facility]        Today's Diagnoses     Mood disorder (H)    -  1    Major depressive disorder, recurrent episode, moderate (H)           Follow-ups after your visit        Follow-up notes from your care team     Return in about 1 month (around 6/18/2017), or mood disorder.      Your next 10 appointments already scheduled     May 22, 2017  3:15 PM CDT   (Arrive by 3:00 PM)   CONSULT with Chris Lara DO   [facility] [place] ([facility])    [address]  [place] [address]   [phone]            Jun 21, 2017  4:00 PM CDT   (Arrive by 3:40 PM)   SHORT with Elijah Villaseñor DO   [facility] [place] ([facility])    [address]  [place] [address]   [phone]              Who to contact     If you have questions or need follow up information about today's clinic visit or your schedule please contact [facility] directly at [phone].  Normal or non-critical lab and imaging results will be communicated to you by MyChart, letter or phone within 4 business days after the clinic has received the results. If you do not hear from us within 7 days, please contact the clinic through MyChart or phone. If you have a critical or abnormal lab result, we will notify you by phone as soon as possible.  Submit refill requests through [employer] or call your pharmacy and they will forward the refill request to us. Please allow 3 business days for your refill to be completed.          Additional Information About Your Visit        [email or web address] Information     [employer] lets you send messages to your doctor, view your test results, renew your prescriptions, schedule appointments and more. To sign up, go to  "[email or web address] . Click on \"Log in\" on the left side of the screen, which will take you to the Welcome page. Then click on \"Sign up Now\" on the right side of the page.     You will be asked to enter the access code listed below, as well as some personal information. Please follow the directions to create your username and password.     Your access code is: [email or web address]  Expires: 2017  2:11 PM     Your access code will  in 90 days. If you need help or a new code, please call your [place] clinic or [phone].        Care EveryWhere ID     This is your Care EveryWhere ID. This could be used by other organizations to access your [place] medical records  [id number]        Your Vitals Were     Pulse Temperature                72 97  F (36.1  C)           Blood Pressure from Last 3 Encounters:   17 110/70   17 126/82   17 131/83    Weight from Last 3 Encounters:   17 178 lb (80.7 kg)              Today, you had the following     No orders found for display         Today's Medication Changes          These changes are accurate as of: 17 11:59 PM.  If you have any questions, ask your nurse or doctor.               Start taking these medicines.        Dose/Directions    lamoTRIgine 25 MG tablet   Commonly known as:  LaMICtal   Used for:  Mood disorder (H)   Started by:  Elijah Villaseñor, DO        Take one tablet by mouth daily for 10 days, then take 2 tablets daily for 10 days, then take 4 tablets per daily.   Quantity:  100 tablet   Refills:  0            Where to get your medicines      These medications were sent to [facility] - [address]  [address] [address]     Phone:  [phone]     lamoTRIgine 25 MG tablet    sertraline 100 MG tablet                Primary Care Provider Office Phone # Fax #    Elijah Villaseñor [phone]       [facility] [place] [address] " [id number]        Thank you!     Thank you for [facility]  for your care. Our goal is always to provide you with excellent care. Hearing back from our patients is one way we can continue to improve our services. Please take a few minutes to complete the written survey that you may receive in the mail after your visit with us. Thank you!             Your Updated Medication List - Protect others around you: Learn how to safely use, store and throw away your medicines at www.disposemymeds.org.          This list is accurate as of: 5/18/17 11:59 PM.  Always use your most recent med list.                   Brand Name Dispense Instructions for use    buPROPion 150 MG 24 hr tablet    WELLBUTRIN XL    90 tablet    Take 1 tablet (150 mg) by mouth daily       BUSPIRONE HCL PO      Take 30 mg by mouth 2 times daily       gabapentin 300 MG capsule    NEURONTIN    270 capsule    ONE CAP Q AM, TWO CAPS QHS       lamoTRIgine 25 MG tablet    LaMICtal    100 tablet    Take one tablet by mouth daily for 10 days, then take 2 tablets daily for 10 days, then take 4 tablets per daily.       LEVOTHYROXINE SODIUM PO      Take 88 mcg by mouth daily       LIPITOR PO      Take 20 mg by mouth daily       sertraline 100 MG tablet    ZOLOFT    180 tablet    Take 2 tablets (200 mg) by mouth daily       traZODone 50 MG tablet    DESYREL    90 tablet    Take 1 tablet (50 mg) by mouth At Bedtime MAY TAKE ADDITIONAL DOSE AS NEEDED

## 2017-05-19 ASSESSMENT — ANXIETY QUESTIONNAIRES: GAD7 TOTAL SCORE: 2

## 2017-05-19 ASSESSMENT — PATIENT HEALTH QUESTIONNAIRE - PHQ9: SUM OF ALL RESPONSES TO PHQ QUESTIONS 1-9: 6

## 2017-05-22 RX ORDER — SERTRALINE HYDROCHLORIDE 100 MG/1
200 TABLET, FILM COATED ORAL DAILY
Qty: 180 TABLET | Refills: 3 | Status: SHIPPED | OUTPATIENT
Start: 2017-05-22 | End: 2017-06-15

## 2017-05-22 RX ORDER — LAMOTRIGINE 25 MG/1
TABLET ORAL
Qty: 100 TABLET | Refills: 0 | Status: SHIPPED | OUTPATIENT
Start: 2017-05-22 | End: 2017-06-15

## 2017-05-24 DIAGNOSIS — F17.290 NICOTINE DEPENDENCE, OTHER TOBACCO PRODUCT, UNCOMPLICATED: ICD-10-CM

## 2017-05-24 DIAGNOSIS — E78.2 MIXED HYPERLIPIDEMIA: ICD-10-CM

## 2017-05-24 DIAGNOSIS — E03.9 ACQUIRED HYPOTHYROIDISM: ICD-10-CM

## 2017-05-24 DIAGNOSIS — Z12.11 COLON CANCER SCREENING: ICD-10-CM

## 2017-05-24 LAB
ALBUMIN SERPL-MCNC: 4.1 G/DL (ref 3.4–5)
ALP SERPL-CCNC: 70 U/L (ref 40–150)
ALT SERPL W P-5'-P-CCNC: 20 U/L (ref 0–70)
ANION GAP SERPL CALCULATED.3IONS-SCNC: 8 MMOL/L (ref 3–14)
AST SERPL W P-5'-P-CCNC: 14 U/L (ref 0–45)
BILIRUB SERPL-MCNC: 0.3 MG/DL (ref 0.2–1.3)
BUN SERPL-MCNC: 7 MG/DL (ref 7–30)
CALCIUM SERPL-MCNC: 8.9 MG/DL (ref 8.5–10.1)
CHLORIDE SERPL-SCNC: 105 MMOL/L (ref 94–109)
CHOLEST SERPL-MCNC: 159 MG/DL
CO2 SERPL-SCNC: 28 MMOL/L (ref 20–32)
CREAT SERPL-MCNC: 1.01 MG/DL (ref 0.66–1.25)
ERYTHROCYTE [DISTWIDTH] IN BLOOD BY AUTOMATED COUNT: 13.5 % (ref 10–15)
GFR SERPL CREATININE-BSD FRML MDRD: 78 ML/MIN/1.7M2
GLUCOSE SERPL-MCNC: 92 MG/DL (ref 70–99)
HCT VFR BLD AUTO: 44.9 % (ref 40–53)
HDLC SERPL-MCNC: 43 MG/DL
HGB BLD-MCNC: 15.5 G/DL (ref 13.3–17.7)
LDLC SERPL CALC-MCNC: 90 MG/DL
MCH RBC QN AUTO: 33.1 PG (ref 26.5–33)
MCHC RBC AUTO-ENTMCNC: 34.5 G/DL (ref 31.5–36.5)
MCV RBC AUTO: 96 FL (ref 78–100)
NONHDLC SERPL-MCNC: 116 MG/DL
PLATELET # BLD AUTO: 345 10E9/L (ref 150–450)
POTASSIUM SERPL-SCNC: 4.4 MMOL/L (ref 3.4–5.3)
PROT SERPL-MCNC: 7.2 G/DL (ref 6.8–8.8)
RBC # BLD AUTO: 4.68 10E12/L (ref 4.4–5.9)
SODIUM SERPL-SCNC: 141 MMOL/L (ref 133–144)
T4 FREE SERPL-MCNC: 0.76 NG/DL (ref 0.76–1.46)
TRIGL SERPL-MCNC: 128 MG/DL
TSH SERPL DL<=0.05 MIU/L-ACNC: 7.62 MU/L (ref 0.4–4)
WBC # BLD AUTO: 8.6 10E9/L (ref 4–11)

## 2017-05-24 PROCEDURE — 36415 COLL VENOUS BLD VENIPUNCTURE: CPT | Mod: ZL | Performed by: INTERNAL MEDICINE

## 2017-05-24 PROCEDURE — 80053 COMPREHEN METABOLIC PANEL: CPT | Mod: ZL | Performed by: INTERNAL MEDICINE

## 2017-05-24 PROCEDURE — 84443 ASSAY THYROID STIM HORMONE: CPT | Mod: ZL | Performed by: INTERNAL MEDICINE

## 2017-05-24 PROCEDURE — 80061 LIPID PANEL: CPT | Mod: ZL | Performed by: INTERNAL MEDICINE

## 2017-05-24 PROCEDURE — 84439 ASSAY OF FREE THYROXINE: CPT | Mod: ZL | Performed by: INTERNAL MEDICINE

## 2017-05-24 PROCEDURE — 85027 COMPLETE CBC AUTOMATED: CPT | Mod: ZL | Performed by: INTERNAL MEDICINE

## 2017-05-31 ENCOUNTER — TELEPHONE (OUTPATIENT)
Dept: PEDIATRICS | Facility: OTHER | Age: 52
End: 2017-05-31

## 2017-05-31 DIAGNOSIS — R10.9 ABDOMINAL WALL PAIN: Primary | ICD-10-CM

## 2017-05-31 NOTE — TELEPHONE ENCOUNTER
Waiting for orders for US of hernia. They were waiting for the call but I dont see any orders in computer. Please advise. Thank you

## 2017-06-05 ENCOUNTER — HOSPITAL ENCOUNTER (OUTPATIENT)
Dept: ULTRASOUND IMAGING | Facility: HOSPITAL | Age: 52
Discharge: HOME | End: 2017-06-05
Attending: INTERNAL MEDICINE | Admitting: INTERNAL MEDICINE
Payer: MEDICARE

## 2017-06-05 PROCEDURE — 76705 ECHO EXAM OF ABDOMEN: CPT | Mod: TC

## 2017-06-06 DIAGNOSIS — K43.9 VENTRAL HERNIA WITHOUT OBSTRUCTION OR GANGRENE: Primary | ICD-10-CM

## 2017-06-14 ENCOUNTER — TELEPHONE (OUTPATIENT)
Dept: INTERNAL MEDICINE | Facility: OTHER | Age: 52
End: 2017-06-14

## 2017-06-14 DIAGNOSIS — F39 MOOD DISORDER (H): ICD-10-CM

## 2017-06-14 DIAGNOSIS — G47.00 PERSISTENT INSOMNIA: ICD-10-CM

## 2017-06-14 DIAGNOSIS — E03.9 ACQUIRED HYPOTHYROIDISM: Primary | ICD-10-CM

## 2017-06-14 DIAGNOSIS — E78.2 MIXED HYPERLIPIDEMIA: ICD-10-CM

## 2017-06-14 DIAGNOSIS — F41.1 GAD (GENERALIZED ANXIETY DISORDER): ICD-10-CM

## 2017-06-14 DIAGNOSIS — F33.1 MAJOR DEPRESSIVE DISORDER, RECURRENT EPISODE, MODERATE (H): ICD-10-CM

## 2017-06-14 NOTE — TELEPHONE ENCOUNTER
[facility] of Russ[facility] meds as wife states we need to call an MD who hasn't seen patient yet in [place] but does have his records. Unsure if this md will fill seeing as he has not seen patient yet. Will address this tomorrow.

## 2017-06-14 NOTE — TELEPHONE ENCOUNTER
11:55 AM    Reason for Call: Phone Call    Description: Patients spouse called, and would like to talk to the nurse about a medication, if you could call her back at [phone]    Was an appointment offered for this call? Yes    Preferred method for responding to this message: Telephone Call    If we cannot reach you directly, may we leave a detailed response at the number you provided? No    Can this message wait until your PCP/provider returns, if available today? PCP is in    [place] Ace[place]

## 2017-06-15 RX ORDER — LAMOTRIGINE 25 MG/1
TABLET ORAL
Qty: 300 TABLET | Refills: 3 | Status: SHIPPED | OUTPATIENT
Start: 2017-06-15 | End: 2018-04-05 | Stop reason: SINTOL

## 2017-06-15 RX ORDER — LEVOTHYROXINE SODIUM 88 UG/1
88 CAPSULE ORAL DAILY
Qty: 90 CAPSULE | Refills: 3 | Status: SHIPPED | OUTPATIENT
Start: 2017-06-15

## 2017-06-15 RX ORDER — GABAPENTIN 300 MG/1
CAPSULE ORAL
Qty: 270 CAPSULE | Refills: 3 | Status: SHIPPED | OUTPATIENT
Start: 2017-06-15

## 2017-06-15 RX ORDER — TRAZODONE HYDROCHLORIDE 50 MG/1
50 TABLET, FILM COATED ORAL AT BEDTIME
Qty: 100 TABLET | Refills: 3 | Status: SHIPPED | OUTPATIENT
Start: 2017-06-15 | End: 2018-02-15

## 2017-06-15 RX ORDER — SERTRALINE HYDROCHLORIDE 100 MG/1
200 TABLET, FILM COATED ORAL DAILY
Qty: 180 TABLET | Refills: 3 | Status: SHIPPED | OUTPATIENT
Start: 2017-06-15 | End: 2019-02-25

## 2017-06-15 RX ORDER — BUSPIRONE HYDROCHLORIDE 30 MG/1
30 TABLET ORAL 2 TIMES DAILY
Qty: 180 TABLET | Refills: 3 | Status: SHIPPED | OUTPATIENT
Start: 2017-06-15

## 2017-06-15 RX ORDER — BUPROPION HYDROCHLORIDE 150 MG/1
150 TABLET ORAL DAILY
Qty: 90 TABLET | Refills: 5 | Status: SHIPPED | OUTPATIENT
Start: 2017-06-15

## 2017-06-15 RX ORDER — ATORVASTATIN CALCIUM 20 MG/1
20 TABLET, FILM COATED ORAL DAILY
Qty: 90 TABLET | Refills: 3 | Status: SHIPPED | OUTPATIENT
Start: 2017-06-15

## 2017-06-15 NOTE — TELEPHONE ENCOUNTER
Talked with pharmacy. He stated it should be fine to just fax medications to [place] and they should be able to fill them. Gave me pharmacy fax numbers. Explained that patient moved here but was unable to [facility] at [facility] [place] [facility] as they do not have a ride there. Will pend orders for meds and fax when printed out and go from there. Thank you

## 2017-06-21 ENCOUNTER — OFFICE VISIT (OUTPATIENT)
Dept: PEDIATRICS | Facility: OTHER | Age: 52
End: 2017-06-21
Attending: INTERNAL MEDICINE
Payer: MEDICARE

## 2017-06-21 ENCOUNTER — OFFICE VISIT (OUTPATIENT)
Dept: SURGERY | Facility: OTHER | Age: 52
End: 2017-06-21
Attending: INTERNAL MEDICINE
Payer: MEDICARE

## 2017-06-21 VITALS
SYSTOLIC BLOOD PRESSURE: 126 MMHG | HEIGHT: 67 IN | BODY MASS INDEX: 27 KG/M2 | WEIGHT: 172 LBS | HEART RATE: 80 BPM | TEMPERATURE: 98.2 F | DIASTOLIC BLOOD PRESSURE: 80 MMHG | OXYGEN SATURATION: 95 %

## 2017-06-21 VITALS
DIASTOLIC BLOOD PRESSURE: 78 MMHG | HEART RATE: 74 BPM | RESPIRATION RATE: 20 BRPM | WEIGHT: 172 LBS | TEMPERATURE: 98.3 F | OXYGEN SATURATION: 97 % | BODY MASS INDEX: 26.94 KG/M2 | SYSTOLIC BLOOD PRESSURE: 134 MMHG

## 2017-06-21 DIAGNOSIS — F17.200 TOBACCO USE DISORDER: ICD-10-CM

## 2017-06-21 DIAGNOSIS — J43.9 PULMONARY EMPHYSEMA, UNSPECIFIED EMPHYSEMA TYPE (H): Primary | ICD-10-CM

## 2017-06-21 DIAGNOSIS — F41.1 GAD (GENERALIZED ANXIETY DISORDER): Primary | ICD-10-CM

## 2017-06-21 DIAGNOSIS — K40.20 BILATERAL INGUINAL HERNIA WITHOUT OBSTRUCTION OR GANGRENE, RECURRENCE NOT SPECIFIED: ICD-10-CM

## 2017-06-21 DIAGNOSIS — F39 MOOD DISORDER (H): ICD-10-CM

## 2017-06-21 PROCEDURE — 99204 OFFICE O/P NEW MOD 45 MIN: CPT | Performed by: SURGERY

## 2017-06-21 PROCEDURE — 99212 OFFICE O/P EST SF 10 MIN: CPT | Mod: 27

## 2017-06-21 PROCEDURE — 99213 OFFICE O/P EST LOW 20 MIN: CPT

## 2017-06-21 PROCEDURE — 99213 OFFICE O/P EST LOW 20 MIN: CPT | Performed by: INTERNAL MEDICINE

## 2017-06-21 RX ORDER — CEFAZOLIN SODIUM 2 G/100ML
2 INJECTION, SOLUTION INTRAVENOUS
Status: CANCELLED | OUTPATIENT
Start: 2017-06-21

## 2017-06-21 ASSESSMENT — ANXIETY QUESTIONNAIRES
GAD7 TOTAL SCORE: 12
3. WORRYING TOO MUCH ABOUT DIFFERENT THINGS: SEVERAL DAYS
6. BECOMING EASILY ANNOYED OR IRRITABLE: NEARLY EVERY DAY
1. FEELING NERVOUS, ANXIOUS, OR ON EDGE: MORE THAN HALF THE DAYS
7. FEELING AFRAID AS IF SOMETHING AWFUL MIGHT HAPPEN: MORE THAN HALF THE DAYS
IF YOU CHECKED OFF ANY PROBLEMS ON THIS QUESTIONNAIRE, HOW DIFFICULT HAVE THESE PROBLEMS MADE IT FOR YOU TO DO YOUR WORK, TAKE CARE OF THINGS AT HOME, OR GET ALONG WITH OTHER PEOPLE: SOMEWHAT DIFFICULT
5. BEING SO RESTLESS THAT IT IS HARD TO SIT STILL: SEVERAL DAYS
2. NOT BEING ABLE TO STOP OR CONTROL WORRYING: SEVERAL DAYS

## 2017-06-21 ASSESSMENT — PAIN SCALES - GENERAL
PAINLEVEL: NO PAIN (0)
PAINLEVEL: NO PAIN (0)

## 2017-06-21 ASSESSMENT — PATIENT HEALTH QUESTIONNAIRE - PHQ9: 5. POOR APPETITE OR OVEREATING: MORE THAN HALF THE DAYS

## 2017-06-21 NOTE — NURSING NOTE
"Chief Complaint   Patient presents with     Consult     hernia       Initial /80 (BP Location: Right arm, Cuff Size: Adult Regular)  Pulse 80  Temp 98.2  F (36.8  C) (Tympanic)  Ht 5' 7\" (1.702 m)  Wt 172 lb (78 kg)  SpO2 95%  BMI 26.94 kg/m2 Estimated body mass index is 26.94 kg/(m^2) as calculated from the following:    Height as of this encounter: 5' 7\" (1.702 m).    Weight as of this encounter: 172 lb (78 kg).  Medication Reconciliation: complete   Sarah Fajardo LPN      "
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
## 2017-06-21 NOTE — NURSING NOTE
"Chief Complaint   Patient presents with     Depression     Anxiety       Initial /78 (BP Location: Left arm, Patient Position: Chair, Cuff Size: Adult Large)  Pulse 74  Temp 98.3  F (36.8  C) (Tympanic)  Resp 20  Wt 172 lb (78 kg)  SpO2 97%  BMI 26.94 kg/m2 Estimated body mass index is 26.94 kg/(m^2) as calculated from the following:    Height as of an earlier encounter on 6/21/17: 5' 7\" (1.702 m).    Weight as of this encounter: 172 lb (78 kg).  Medication Reconciliation: complete   Jen Self LPN      "

## 2017-06-21 NOTE — PATIENT INSTRUCTIONS
Thank you for allowing Dr. Hemphill and our surgical team to participate in your care.  If you have a scheduling or an appointment question please contact our Health Unit Coordinator, Keysha,  at her direct line [phone].   ALL nursing questions or concerns can be directed to your surgical nurse at: [phone] -Ale    The patient and wife are unable to get voice mail so there must be person to person contact for instructions, etc.      You are scheduled for a: laparoscopic possible open repair bilateral inguinal hernias    Your procedure date is:  Monday, July 10, 2017    NOTHING TO EAT OR DRINK AFTER MIDNIGHT THE EVENING PRIOR TO SURGERY 7/9/17        HOW TO PREPARE-    You need to have a scheduled Pre-Op with your primary care physician within 2 weeks days of the procedure.      You need a friend or family member available to drive you home AND stay with you for 24 hours after you leave the hospital. You will not be allowed to drive yourself. IF you need to take a taxi or the bus you MUST have a responsible person to ride with you. YOUR PROCEDURE WILL BE CANCELLED IF YOU DO NOT HAVE A RESPONSIBLE ADULT TO DRIVE YOU HOME.       You CANNOT have anything to eat or drink after midnight the night before your surgery, ncluding water and coffee. Your stomach needs to be completely empty. Do NOT chew gum, suck on hard candy, or smoke. You can brush your teeth the morning of surgery.       You need to call our Surgery Education Nurses 1-2 weeks prior to your surgery date at  [phone] or toll free [phone]. Please have you medication and allergy lists ready.      Stop your aspirin or other NSAIDs(Ibuprofen, Motrin, Aleve, Celebrex, Naproxen, etc...) 7 days before your surgery.      Hospital admitting will call you the day before your surgery with your arrival time. If you are scheduled on a Monday admitting will call you the Friday before.      Please call your primary care physician if you should become ill  within 24 hours of scheduled surgery. (ex.vomiting, diarrhea, fever)          You will need to wash the night before AND the morning of you procedure with the supplied Hibiclens. Wash your Surgical area with your bare hands, apply friction and rinse. KEEP IT AWAY FROM YOUR EYES, EARS, NOSE AND MOUTH.

## 2017-06-21 NOTE — MR AVS SNAPSHOT
After Visit Summary   6/21/2017    Vinny Hsu    MRN: [id number]           Patient Information     Date Of Birth          1965        Visit Information        Provider Department      6/21/2017 3:00 PM Bridget Hemphill MD [facility]        Today's Diagnoses     Pulmonary emphysema, unspecified emphysema type (H)    -  1    Tobacco use disorder        Bilateral inguinal hernia without obstruction or gangrene, recurrence not specified          Care Instructions    Thank you for allowing Dr. Hemphill and our surgical team to participate in your care.  If you have a scheduling or an appointment question please contact our Health Unit Coordinator, Keysha,  at her direct line [phone].   ALL nursing questions or concerns can be directed to your surgical nurse at: [phone] -Ale    The patient and wife are unable to get voice mail so there must be person to person contact for instructions, etc.      You are scheduled for a: laparoscopic possible open repair bilateral inguinal hernias    Your procedure date is:  Monday, July 10, 2017    NOTHING TO EAT OR DRINK AFTER MIDNIGHT THE EVENING PRIOR TO SURGERY 7/9/17        HOW TO PREPARE-    You need to have a scheduled Pre-Op with your primary care physician within 2 weeks days of the procedure.      You need a friend or family member available to drive you home AND stay with you for 24 hours after you leave the hospital. You will not be allowed to drive yourself. IF you need to take a taxi or the bus you MUST have a responsible person to ride with you. YOUR PROCEDURE WILL BE CANCELLED IF YOU DO NOT HAVE A RESPONSIBLE ADULT TO DRIVE YOU HOME.       You CANNOT have anything to eat or drink after midnight the night before your surgery, ncluding water and coffee. Your stomach needs to be completely empty. Do NOT chew gum, suck on hard candy, or smoke. You can brush your teeth the morning of surgery.       You need to call our  Surgery Education Nurses 1-2 weeks prior to your surgery date at  [phone] or toll free [phone]. Please have you medication and allergy lists ready.      Stop your aspirin or other NSAIDs(Ibuprofen, Motrin, Aleve, Celebrex, Naproxen, etc...) 7 days before your surgery.      Hospital admitting will call you the day before your surgery with your arrival time. If you are scheduled on a Monday admitting will call you the Friday before.      Please call your primary care physician if you should become ill within 24 hours of scheduled surgery. (ex.vomiting, diarrhea, fever)          You will need to wash the night before AND the morning of you procedure with the supplied Hibiclens. Wash your Surgical area with your bare hands, apply friction and rinse. KEEP IT AWAY FROM YOUR EYES, EARS, NOSE AND MOUTH.             Follow-ups after your visit        Your next 10 appointments already scheduled     Jun 21, 2017  4:00 PM CDT   (Arrive by 3:40 PM)   SHORT with Elijah Villaseñor DO   [facility] ([facility] )    [address] [place] [place] [address]   [phone]            Jul 05, 2017  4:20 PM CDT   (Arrive by 4:00 PM)   Pre-Op physical with Elijah Villaseñor DO   [facility] ([facility] )    [address] [place] [address]   [phone]              Who to contact     If you have questions or need follow up information about today's clinic visit or your schedule please contact [facility] directly at [phone].  Normal or non-critical lab and imaging results will be communicated to you by MyChart, letter or phone within 4 business days after the clinic has received the results. If you do not hear from us within 7 days, please contact the clinic through MyChart or phone. If you have a critical or abnormal lab result, we will notify you by phone as soon as possible.  Submit refill requests through [employer] or call your  "pharmacy and they will forward the refill request to us. Please allow 3 business days for your refill to be completed.          Additional Information About Your Visit        MyChart Information     [employer] lets you send messages to your doctor, view your test results, renew your prescriptions, schedule appointments and more. To sign up, go to [email or web address] . Click on \"Log in\" on the left side of the screen, which will take you to the Welcome page. Then click on \"Sign up Now\" on the right side of the page.     You will be asked to enter the access code listed below, as well as some personal information. Please follow the directions to create your username and password.     Your access code is: [email or web address]  Expires: 2017  2:11 PM     Your access code will  in 90 days. If you need help or a new code, please call your [place] clinic or [phone].        Care EveryWhere ID     This is your [facility] EveryWhere ID. This could be used by other organizations to access your [place] medical records  [id number]        Your Vitals Were     Pulse Temperature Height Pulse Oximetry BMI (Body Mass Index)       80 98.2  F (36.8  C) (Tympanic) 5' 7\" (1.702 m) 95% 26.94 kg/m2        Blood Pressure from Last 3 Encounters:   17 126/80   17 110/70   17 126/82    Weight from Last 3 Encounters:   17 172 lb (78 kg)   17 178 lb (80.7 kg)              Today, you had the following     No orders found for display       Primary Care Provider Office Phone # Fax #    Elijah Martin Villaseñor [phone]       [facility] [place] [address]  [place] [address]        Equal Access to Services     AMADA CHRISTENSEN : Tanesha Pugh, waaxda luqadaha, qaybta kaalmada ademargaritayayahir, sami urbina. So [facility] [phone].    ATENCIÓN: Si habla español, tiene a salcedo disposición servicios gratuitos de asistencia lingüística. Llame al " [phone].    We comply with applicable federal civil rights laws and [place] laws. We do not discriminate on the basis of race, color, national origin, age, disability sex, sexual orientation or gender identity.            Thank you!     Thank you for choosing [facility]  for your care. Our goal is always to provide you with excellent care. Hearing back from our patients is one way we can continue to improve our services. Please take a few minutes to complete the written survey that you may receive in the mail after your visit with us. Thank you!             Your Updated Medication List - Protect others around you: Learn how to safely use, store and throw away your medicines at www.disposemymeds.org.          This list is accurate as of: 6/21/17  3:25 PM.  Always use your most recent med list.                   Brand Name Dispense Instructions for use Diagnosis    atorvastatin 20 MG tablet    LIPITOR    90 tablet    Take 1 tablet (20 mg) by mouth daily    Mixed hyperlipidemia       buPROPion 150 MG 24 hr tablet    WELLBUTRIN XL    90 tablet    Take 1 tablet (150 mg) by mouth daily    Major depressive disorder, recurrent episode, moderate (H), Mood disorder (H)       BusPIRone HCl 30 MG Tabs     180 tablet    Take 30 mg by mouth 2 times daily    MARY (generalized anxiety disorder)       gabapentin 300 MG capsule    NEURONTIN    270 capsule    ONE CAP Q AM, TWO CAPS QHS    Mood disorder (H)       lamoTRIgine 25 MG tablet    LaMICtal    300 tablet    Take one tablet by mouth daily for 10 days, then take 2 tablets daily for 10 days, then take 4 tablets per daily.    Mood disorder (H)       Levothyroxine Sodium 88 MCG Caps     90 capsule    Take 88 mcg by mouth daily    Acquired hypothyroidism       sertraline 100 MG tablet    ZOLOFT    180 tablet    Take 2 tablets (200 mg) by mouth daily    Major depressive disorder, recurrent episode, moderate (H)       traZODone 50 MG tablet    DESYREL    100 tablet     Take 1 tablet (50 mg) by mouth At Bedtime MAY TAKE ADDITIONAL DOSE AS NEEDED    Persistent insomnia

## 2017-06-21 NOTE — MR AVS SNAPSHOT
"              After Visit Summary   6/21/2017    Vinny Hsu    MRN: [id number]           Patient Information     Date Of Birth          1965        Visit Information        Provider Department      6/21/2017 4:00 PM Elijah Villaseñor, DO [facility]        Today's Diagnoses     MARY (generalized anxiety disorder)    -  1    Mood disorder (H)           Follow-ups after your visit        Your next 10 appointments already scheduled     Jul 13, 2017   Procedure with rBidget Hemphill MD   [facility] )    [address] [place] [id number] [phone]              Who to contact     If you have questions or need follow up information about today's clinic visit or your schedule please contact [facility] directly at [phone].  Normal or non-critical lab and imaging results will be communicated to you by MyChart, letter or phone within 4 business days after the clinic has received the results. If you do not hear from us within 7 days, please contact the clinic through MyChart or phone. If you have a critical or abnormal lab result, we will notify you by phone as soon as possible.  Submit refill requests through [employer] or call your pharmacy and they will forward the refill request to us. Please allow 3 business days for your refill to be completed.          Additional Information About Your Visit        MyChart Information     [employer] lets you send messages to your doctor, view your test results, renew your prescriptions, schedule appointments and more. To sign up, go to [email or web address] . Click on \"Log in\" on the left side of the screen, which will take you to the Welcome page. Then click on \"Sign up Now\" on the right side of the page.     You will be asked to enter the access code listed below, as well as some personal information. Please follow the directions to create your username and password.     Your access code is: " [email or web address]  Expires: 2017  2:11 PM     Your access code will  in 90 days. If you need help or a new code, please call your [place] clinic or [phone].        Care EveryWhere ID     This is your Care EveryWhere ID. This could be used by other organizations to access your [place] medical records  [id number]        Your Vitals Were     Pulse Temperature Respirations Pulse Oximetry BMI (Body Mass Index)       74 98.3  F (36.8  C) (Tympanic) 20 97% 26.94 kg/m2        Blood Pressure from Last 3 Encounters:   17 132/62   17 134/78   17 126/80    Weight from Last 3 Encounters:   17 175 lb (79.4 kg)   17 172 lb (78 kg)   17 172 lb (78 kg)              Today, you had the following     No orders found for display       Primary Care Provider Office Phone # Fax #    Elijah Martin DO Charleen [phone]       [facility] [place] [address]  [place] [address]        Equal Access to Services     [facility] Center[facility]AY[facility] AH: Hadii aad ku hadasho Soomaali, waaxda luqadaha, qaybta kaalmada adeegyada, waxay idiin hayaan adeeg candacearalucila lawilliams . So [facility] [phone].    ATENCIÓN: Si habla español, tiene a salcedo disposición servicios gratuitos de asistencia lingüística. Llame al [phone].    We comply with applicable federal civil rights laws and [place] laws. We do not discriminate on the basis of race, color, national origin, age, disability sex, sexual orientation or gender identity.            Thank you!     Thank you for choosing [facility] [place]  for your care. Our goal is always to provide you with excellent care. Hearing back from our patients is one way we can continue to improve our services. Please take a few minutes to complete the written survey that you may receive in the mail after your visit with us. Thank you!             Your Updated Medication List - Protect others around you: Learn how to safely use, store and throw away your medicines at  www.disposemymeds.org.          This list is accurate as of: 6/21/17 11:59 PM.  Always use your most recent med list.                   Brand Name Dispense Instructions for use Diagnosis    atorvastatin 20 MG tablet    LIPITOR    90 tablet    Take 1 tablet (20 mg) by mouth daily    Mixed hyperlipidemia       buPROPion 150 MG 24 hr tablet    WELLBUTRIN XL    90 tablet    Take 1 tablet (150 mg) by mouth daily    Major depressive disorder, recurrent episode, moderate (H), Mood disorder (H)       BusPIRone HCl 30 MG Tabs     180 tablet    Take 30 mg by mouth 2 times daily    MARY (generalized anxiety disorder)       gabapentin 300 MG capsule    NEURONTIN    270 capsule    ONE CAP Q AM, TWO CAPS QHS    Mood disorder (H)       lamoTRIgine 25 MG tablet    LaMICtal    300 tablet    Take one tablet by mouth daily for 10 days, then take 2 tablets daily for 10 days, then take 4 tablets per daily.    Mood disorder (H)       Levothyroxine Sodium 88 MCG Caps     90 capsule    Take 88 mcg by mouth daily    Acquired hypothyroidism       sertraline 100 MG tablet    ZOLOFT    180 tablet    Take 2 tablets (200 mg) by mouth daily    Major depressive disorder, recurrent episode, moderate (H)       traZODone 50 MG tablet    DESYREL    100 tablet    Take 1 tablet (50 mg) by mouth At Bedtime MAY TAKE ADDITIONAL DOSE AS NEEDED    Persistent insomnia

## 2017-06-21 NOTE — PROGRESS NOTES
SUBJECTIVE:                                                    Vinny Hsu is a 51 year old male who presents to clinic today for the following health issues:        Depression and Anxiety Follow-Up    Status since last visit: Worsened due to pain and stress    Other associated symptoms:None    Complicating factors:     Significant life event: Yes-  Friends cancer     Current substance abuse: None    PHQ-9 SCORE 3/16/2017 5/18/2017 6/21/2017   Total Score 9 6 8     MARY-7 SCORE 3/16/2017 5/18/2017 6/21/2017   Total Score 10 2 12        PHQ-9  English      PHQ-9   Any Language     GAD7       Amount of exercise or physical activity: 2-3 days/week for an average of 30-45 minutes    Problems taking medications regularly: Yes,  problems remembering to take    Medication side effects: none    Diet: regular (no restrictions)      -------------------------------------    Problem list and histories reviewed & adjusted, as indicated.  Additional history: as documented    There is no problem list on file for this patient.    Past Surgical History:   Procedure Laterality Date     DENTAL SURGERY Bilateral     ALL UPPER TEETH GONE       Social History   Substance Use Topics     Smoking status: Current Every Day Smoker     Types: Cigars     Smokeless tobacco: Not on file     Alcohol use Yes     Family History   Problem Relation Age of Onset     Other Cancer Mother      Coronary Artery Disease Paternal Grandfather            Reviewed and updated as needed this visit by clinical staff  Allergies       Reviewed and updated as needed this visit by Provider         ROS:  C: NEGATIVE for fever, chills, change in weight  E: NEGATIVE for vision changes or irritation  R: NEGATIVE for significant cough or SOB  CV: NEGATIVE for chest pain, palpitations or peripheral edema  GI: NEGATIVE for nausea, abdominal pain, heartburn, or change in bowel habits  M: NEGATIVE for significant arthralgias or myalgia  N: NEGATIVE for weakness,  dizziness or paresthesias  E: NEGATIVE for temperature intolerance, skin/hair changes  H: NEGATIVE for bleeding problems  P: NEGATIVE for changes in mood or affect    OBJECTIVE:                                                    /78 (BP Location: Left arm, Patient Position: Chair, Cuff Size: Adult Large)  Pulse 74  Temp 98.3  F (36.8  C) (Tympanic)  Resp 20  Wt 172 lb (78 kg)  SpO2 97%  BMI 26.94 kg/m2  Body mass index is 26.94 kg/(m^2).  GENERAL: healthy, alert and no distress  RESP: lungs clear to auscultation - no rales, rhonchi or wheezes  CV: regular rate and rhythm, normal S1 S2, no S3 or S4, no murmur, click or rub, no peripheral edema and peripheral pulses strong  ABDOMEN: soft, nontender, no hepatosplenomegaly, no masses and bowel sounds normal  MS: no gross musculoskeletal defects noted, no edema  PSYCH: mentation appears normal, affect normal/bright    Diagnostic Test Results:  none      ASSESSMENT/PLAN:                                                        (F41.1) MARY (generalized anxiety disorder)  (primary encounter diagnosis)  Comment: Fair control.  He has tourette's syndrome which appers as thiugh he anxious at times.  Plan:   He will continue Buspar 30 mg BID.  He will continue Zoloft 200 mg daily.    (F39) Mood disorder (H)  Comment: He has anger outbursts at times.  Plan:   Start Lamictal 25 mg and double the dose in 10 days.  He willcontinue Wellbutrin 150 mg daily, gabapentin 300 mg with one capsule in the and 2 capsules at night.          Follow up with Provider - 6 weeks for mood disorder.                Elijah Villaseñor DO,   [facility]

## 2017-06-22 ENCOUNTER — TELEPHONE (OUTPATIENT)
Dept: SURGERY | Facility: OTHER | Age: 52
End: 2017-06-22

## 2017-06-22 ASSESSMENT — PATIENT HEALTH QUESTIONNAIRE - PHQ9: SUM OF ALL RESPONSES TO PHQ QUESTIONS 1-9: 8

## 2017-06-22 ASSESSMENT — ANXIETY QUESTIONNAIRES: GAD7 TOTAL SCORE: 12

## 2017-06-22 NOTE — TELEPHONE ENCOUNTER
Patient contacted regarding his surgery date for a bilateral inguinal hernia repair.  He is rescheduled from July 10, 2017 to July 13, 2017 with Dr Hemphill due to the 10th being over booked. Patient agreed to the change.

## 2017-07-05 ENCOUNTER — OFFICE VISIT (OUTPATIENT)
Dept: PEDIATRICS | Facility: OTHER | Age: 52
End: 2017-07-05
Attending: INTERNAL MEDICINE
Payer: MEDICARE

## 2017-07-05 VITALS
RESPIRATION RATE: 20 BRPM | DIASTOLIC BLOOD PRESSURE: 62 MMHG | TEMPERATURE: 98.4 F | HEART RATE: 74 BPM | WEIGHT: 175 LBS | SYSTOLIC BLOOD PRESSURE: 132 MMHG | OXYGEN SATURATION: 94 % | BODY MASS INDEX: 27.41 KG/M2

## 2017-07-05 DIAGNOSIS — E03.9 ACQUIRED HYPOTHYROIDISM: ICD-10-CM

## 2017-07-05 DIAGNOSIS — Z01.818 PREOP GENERAL PHYSICAL EXAM: Primary | ICD-10-CM

## 2017-07-05 DIAGNOSIS — F33.1 MODERATE EPISODE OF RECURRENT MAJOR DEPRESSIVE DISORDER (H): ICD-10-CM

## 2017-07-05 LAB — TSH SERPL DL<=0.005 MIU/L-ACNC: 3 MU/L (ref 0.4–4)

## 2017-07-05 PROCEDURE — 99214 OFFICE O/P EST MOD 30 MIN: CPT | Mod: 25 | Performed by: INTERNAL MEDICINE

## 2017-07-05 PROCEDURE — 93010 ELECTROCARDIOGRAM REPORT: CPT | Performed by: INTERNAL MEDICINE

## 2017-07-05 PROCEDURE — 36415 COLL VENOUS BLD VENIPUNCTURE: CPT | Mod: ZL | Performed by: INTERNAL MEDICINE

## 2017-07-05 PROCEDURE — 93005 ELECTROCARDIOGRAM TRACING: CPT

## 2017-07-05 PROCEDURE — 99213 OFFICE O/P EST LOW 20 MIN: CPT

## 2017-07-05 PROCEDURE — 84443 ASSAY THYROID STIM HORMONE: CPT | Mod: ZL | Performed by: INTERNAL MEDICINE

## 2017-07-05 ASSESSMENT — PAIN SCALES - GENERAL: PAINLEVEL: NO PAIN (0)

## 2017-07-05 NOTE — NURSING NOTE
"Chief Complaint   Patient presents with     Pre-Op Exam       Initial /62 (BP Location: Left arm, Patient Position: Chair, Cuff Size: Adult Large)  Pulse 74  Temp 98.4  F (36.9  C) (Tympanic)  Resp 20  Wt 175 lb (79.4 kg)  SpO2 94%  BMI 27.41 kg/m2 Estimated body mass index is 27.41 kg/(m^2) as calculated from the following:    Height as of 6/21/17: 5' 7\" (1.702 m).    Weight as of this encounter: 175 lb (79.4 kg).  Medication Reconciliation: complete   Jen Self LPN      "
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
## 2017-07-05 NOTE — MR AVS SNAPSHOT
After Visit Summary   7/5/2017    Vinny Hsu    MRN: [id number]           Patient Information     Date Of Birth          1965        Visit Information        Provider Department      7/5/2017 4:00 PM Elijah Villaseñor,  [facility]        Today's Diagnoses     Preop general physical exam    -  1    Acquired hypothyroidism        Moderate episode of recurrent major depressive disorder (H)          Care Instructions      Before Your Surgery      Call your surgeon if there is any change in your health. This includes signs of a cold or flu (such as a sore throat, runny nose, cough, rash or fever).    Do not smoke, drink alcohol or take over the counter medicine (unless your surgeon or primary care doctor tells you to) for the 24 hours before and after surgery.    If you take prescribed drugs: Follow your doctor s orders about which medicines to take and which to stop until after surgery.    Eating and drinking prior to surgery: follow the instructions from your surgeon    Take a shower or bath the night before surgery. Use the soap your surgeon gave you to gently clean your skin. If you do not have soap from your surgeon, use your regular soap. Do not shave or scrub the surgery site.  Wear clean pajamas and have clean sheets on your bed.           Follow-ups after your visit        Your next 10 appointments already scheduled     Jul 13, 2017   Procedure with Bridget Hemphill MD   [facility] ([address] [id number] [phone]              Who to contact     If you have questions or need follow up information about today's clinic visit or your schedule please contact [facility] directly at [phone].  Normal or non-critical lab and imaging results will be communicated to you by MyChart, letter or phone within 4 business days after the clinic has received the results. If you do not hear from us  "within 7 days, please contact the clinic through [employer] or phone. If you have a critical or abnormal lab result, we will notify you by phone as soon as possible.  Submit refill requests through [employer] or call your pharmacy and they will forward the refill request to us. Please allow 3 business days for your refill to be completed.          Additional Information About Your Visit        Let[employer]uugame Information     [employer] lets you send messages to your doctor, view your test results, renew your prescriptions, schedule appointments and more. To sign up, go to [email or web address] . Click on \"Log in\" on the left side of the screen, which will take you to the Welcome page. Then click on \"Sign up Now\" on the right side of the page.     You will be asked to enter the access code listed below, as well as some personal information. Please follow the directions to create your username and password.     Your access code is: [email or web address]  Expires: 2017  2:11 PM     Your access code will  in 90 days. If you need help or a new code, please call your [facility] clinic or [phone].        Care EveryWhere ID     This is your Care EveryWhere ID. This could be used by other organizations to access your [facility] medical records  [id number]        Your Vitals Were     Pulse Temperature Respirations Pulse Oximetry BMI (Body Mass Index)       74 98.4  F (36.9  C) (Tympanic) 20 94% 27.41 kg/m2        Blood Pressure from Last 3 Encounters:   17 132/62   17 134/78   17 126/80    Weight from Last 3 Encounters:   17 175 lb (79.4 kg)   17 172 lb (78 kg)   17 172 lb (78 kg)              We Performed the Following     EKG 12-lead complete w/read - (Clinic Performed)     TSH with free T4 reflex        Primary Care Provider Office Phone # Fax #    Eljiah Villaseñor [phone]       [facility] [place] [address] [place] [address]        Equal Access to " Services     [facility]: Hadii aad ku hadpetertunde Afsanehkirk, waaxda luqadaha, qaybta kaalmada bell, sami urbina. So [facility] [phone].    ATENCIÓN: Si dontrell hayes, tiene a salcedo disposición servicios gratuitos de asistencia lingüística. Llame al [phone].    We comply with applicable federal civil rights laws and [place] laws. We do not discriminate on the basis of race, color, national origin, age, disability sex, sexual orientation or gender identity.            Thank you!     Thank you for choosing [facility]  for your care. Our goal is always to provide you with excellent care. Hearing back from our patients is one way we can continue to improve our services. Please take a few minutes to complete the written survey that you may receive in the mail after your visit with us. Thank you!             Your Updated Medication List - Protect others around you: Learn how to safely use, store and throw away your medicines at www.disposemymeds.org.          This list is accurate as of: 7/5/17  4:30 PM.  Always use your most recent med list.                   Brand Name Dispense Instructions for use Diagnosis    atorvastatin 20 MG tablet    LIPITOR    90 tablet    Take 1 tablet (20 mg) by mouth daily    Mixed hyperlipidemia       buPROPion 150 MG 24 hr tablet    WELLBUTRIN XL    90 tablet    Take 1 tablet (150 mg) by mouth daily    Major depressive disorder, recurrent episode, moderate (H), Mood disorder (H)       BusPIRone HCl 30 MG Tabs     180 tablet    Take 30 mg by mouth 2 times daily    MARY (generalized anxiety disorder)       gabapentin 300 MG capsule    NEURONTIN    270 capsule    ONE CAP Q AM, TWO CAPS QHS    Mood disorder (H)       lamoTRIgine 25 MG tablet    LaMICtal    300 tablet    Take one tablet by mouth daily for 10 days, then take 2 tablets daily for 10 days, then take 4 tablets per daily.    Mood disorder (H)       Levothyroxine Sodium 88 MCG Caps     90  capsule    Take 88 mcg by mouth daily    Acquired hypothyroidism       sertraline 100 MG tablet    ZOLOFT    180 tablet    Take 2 tablets (200 mg) by mouth daily    Major depressive disorder, recurrent episode, moderate (H)       traZODone 50 MG tablet    DESYREL    100 tablet    Take 1 tablet (50 mg) by mouth At Bedtime MAY TAKE ADDITIONAL DOSE AS NEEDED    Persistent insomnia

## 2017-07-12 ENCOUNTER — ANESTHESIA EVENT (OUTPATIENT)
Dept: SURGERY | Facility: HOSPITAL | Age: 52
End: 2017-07-12
Payer: MEDICARE

## 2017-07-12 ASSESSMENT — LIFESTYLE VARIABLES: TOBACCO_USE: 1

## 2017-07-12 ASSESSMENT — COPD QUESTIONNAIRES
COPD: 1
CAT_SEVERITY: MILD
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
## 2017-07-12 NOTE — ANESTHESIA PREPROCEDURE EVALUATION
Anesthesia Evaluation     . Pt has had prior anesthetic. Type: General    No history of anesthetic complications          ROS/MED HX    ENT/Pulmonary:     (+)tobacco use, Past use mild COPD, , . .    Neurologic:  - neg neurologic ROS     Cardiovascular:  - neg cardiovascular ROS       METS/Exercise Tolerance:     Hematologic:  - neg hematologic  ROS       Musculoskeletal:  - neg musculoskeletal ROS (+) arthritis, , , -       GI/Hepatic:  - neg GI/hepatic ROS       Renal/Genitourinary:  - ROS Renal section negative       Endo:     (+) thyroid problem hypothyroidism, .      Psychiatric:     (+) psychiatric history anxiety and depression      Infectious Disease:  - neg infectious disease ROS       Malignancy:      - no malignancy   Other:    - neg other ROS                 Physical Exam      Airway   Mallampati: II  TM distance: >3 FB  Neck ROM: full    Dental   (+) missing and chipped    Cardiovascular   Rhythm and rate: regular and normal      Pulmonary    breath sounds clear to auscultation                    Anesthesia Plan      History & Physical Review  History and physical reviewed and following examination; no interval change.    ASA Status:  3 .    NPO Status:  > 8 hours    Plan for General and ETT with Propofol induction.   PONV prophylaxis:  Ondansetron (or other 5HT-3)       Postoperative Care      Consents  Anesthetic plan, risks, benefits and alternatives discussed with:  Patient..                          .

## 2017-07-13 ENCOUNTER — HOSPITAL ENCOUNTER (OUTPATIENT)
Facility: HOSPITAL | Age: 52
Discharge: HOME | End: 2017-07-13
Attending: SURGERY | Admitting: SURGERY
Payer: MEDICARE

## 2017-07-13 ENCOUNTER — ANESTHESIA (OUTPATIENT)
Dept: SURGERY | Facility: HOSPITAL | Age: 52
End: 2017-07-13
Payer: MEDICARE

## 2017-07-13 VITALS
OXYGEN SATURATION: 97 % | DIASTOLIC BLOOD PRESSURE: 67 MMHG | SYSTOLIC BLOOD PRESSURE: 110 MMHG | RESPIRATION RATE: 16 BRPM | TEMPERATURE: 97.7 F

## 2017-07-13 DIAGNOSIS — Z98.890 STATUS POST INGUINAL HERNIA REPAIR USING SYNTHETIC PATCH: Primary | ICD-10-CM

## 2017-07-13 DIAGNOSIS — Z87.19 STATUS POST INGUINAL HERNIA REPAIR USING SYNTHETIC PATCH: Primary | ICD-10-CM

## 2017-07-13 PROCEDURE — 27210995 ZZH RX 272: Performed by: SURGERY

## 2017-07-13 PROCEDURE — 40000305 ZZH STATISTIC PRE PROC ASSESS I: Performed by: SURGERY

## 2017-07-13 PROCEDURE — 27210794 ZZH OR GENERAL SUPPLY STERILE: Performed by: SURGERY

## 2017-07-13 PROCEDURE — 25000566 ZZH SEVOFLURANE, EA 15 MIN: Performed by: ANESTHESIOLOGY

## 2017-07-13 PROCEDURE — 71000027 ZZH RECOVERY PHASE 2 EACH 15 MINS: Performed by: SURGERY

## 2017-07-13 PROCEDURE — 25000125 ZZHC RX 250: Performed by: NURSE ANESTHETIST, CERTIFIED REGISTERED

## 2017-07-13 PROCEDURE — A9270 NON-COVERED ITEM OR SERVICE: HCPCS | Mod: GY | Performed by: SURGERY

## 2017-07-13 PROCEDURE — C1727 CATH, BAL TIS DIS, NON-VAS: HCPCS | Performed by: SURGERY

## 2017-07-13 PROCEDURE — 27110028 ZZH OR GENERAL SUPPLY NON-STERILE: Performed by: SURGERY

## 2017-07-13 PROCEDURE — 25000128 H RX IP 250 OP 636: Performed by: ANESTHESIOLOGY

## 2017-07-13 PROCEDURE — C1781 MESH (IMPLANTABLE): HCPCS | Performed by: SURGERY

## 2017-07-13 PROCEDURE — 25000128 H RX IP 250 OP 636: Performed by: NURSE ANESTHETIST, CERTIFIED REGISTERED

## 2017-07-13 PROCEDURE — 25000132 ZZH RX MED GY IP 250 OP 250 PS 637: Mod: GY | Performed by: SURGERY

## 2017-07-13 PROCEDURE — 25000125 ZZHC RX 250: Performed by: SURGERY

## 2017-07-13 PROCEDURE — 37000009 ZZH ANESTHESIA TECHNICAL FEE, EACH ADDTL 15 MIN: Performed by: SURGERY

## 2017-07-13 PROCEDURE — 49650 LAP ING HERNIA REPAIR INIT: CPT | Performed by: ANESTHESIOLOGY

## 2017-07-13 PROCEDURE — S0077 INJECTION, CLINDAMYCIN PHOSP: HCPCS | Performed by: SURGERY

## 2017-07-13 PROCEDURE — 49650 LAP ING HERNIA REPAIR INIT: CPT | Performed by: NURSE ANESTHETIST, CERTIFIED REGISTERED

## 2017-07-13 PROCEDURE — 71000014 ZZH RECOVERY PHASE 1 LEVEL 2 FIRST HR: Performed by: SURGERY

## 2017-07-13 PROCEDURE — 36000058 ZZH SURGERY LEVEL 3 EA 15 ADDTL MIN: Performed by: SURGERY

## 2017-07-13 PROCEDURE — 25000128 H RX IP 250 OP 636: Performed by: SURGERY

## 2017-07-13 PROCEDURE — 36000056 ZZH SURGERY LEVEL 3 1ST 30 MIN: Performed by: SURGERY

## 2017-07-13 PROCEDURE — 49650 LAP ING HERNIA REPAIR INIT: CPT | Mod: 50 | Performed by: SURGERY

## 2017-07-13 PROCEDURE — 37000008 ZZH ANESTHESIA TECHNICAL FEE, 1ST 30 MIN: Performed by: SURGERY

## 2017-07-13 DEVICE — MESH-PROGRIP LAPAROSCOPIC 15X10CM: Type: IMPLANTABLE DEVICE | Site: INGUINAL | Status: FUNCTIONAL

## 2017-07-13 RX ORDER — CEFADROXIL 500 MG/1
500 CAPSULE ORAL 2 TIMES DAILY
Qty: 6 CAPSULE | Refills: 0 | Status: SHIPPED | OUTPATIENT
Start: 2017-07-13 | End: 2017-07-26

## 2017-07-13 RX ORDER — BUPIVACAINE HYDROCHLORIDE AND EPINEPHRINE 2.5; 5 MG/ML; UG/ML
INJECTION, SOLUTION EPIDURAL; INFILTRATION; INTRACAUDAL; PERINEURAL PRN
Status: DISCONTINUED | OUTPATIENT
Start: 2017-07-13 | End: 2017-07-13 | Stop reason: HOSPADM

## 2017-07-13 RX ORDER — LIDOCAINE 40 MG/G
CREAM TOPICAL
Status: DISCONTINUED | OUTPATIENT
Start: 2017-07-13 | End: 2017-07-13 | Stop reason: HOSPADM

## 2017-07-13 RX ORDER — FENTANYL CITRATE 50 UG/ML
25-50 INJECTION, SOLUTION INTRAMUSCULAR; INTRAVENOUS EVERY 5 MIN PRN
Status: DISCONTINUED | OUTPATIENT
Start: 2017-07-13 | End: 2017-07-13 | Stop reason: HOSPADM

## 2017-07-13 RX ORDER — ONDANSETRON 4 MG/1
4 TABLET, ORALLY DISINTEGRATING ORAL EVERY 30 MIN PRN
Status: DISCONTINUED | OUTPATIENT
Start: 2017-07-13 | End: 2017-07-13 | Stop reason: HOSPADM

## 2017-07-13 RX ORDER — SODIUM CHLORIDE, SODIUM LACTATE, POTASSIUM CHLORIDE, CALCIUM CHLORIDE 600; 310; 30; 20 MG/100ML; MG/100ML; MG/100ML; MG/100ML
INJECTION, SOLUTION INTRAVENOUS CONTINUOUS
Status: DISCONTINUED | OUTPATIENT
Start: 2017-07-13 | End: 2017-07-13 | Stop reason: HOSPADM

## 2017-07-13 RX ORDER — HYDROMORPHONE HYDROCHLORIDE 1 MG/ML
.3-.5 INJECTION, SOLUTION INTRAMUSCULAR; INTRAVENOUS; SUBCUTANEOUS EVERY 10 MIN PRN
Status: DISCONTINUED | OUTPATIENT
Start: 2017-07-13 | End: 2017-07-13 | Stop reason: HOSPADM

## 2017-07-13 RX ORDER — LIDOCAINE HYDROCHLORIDE 20 MG/ML
INJECTION, SOLUTION INFILTRATION; PERINEURAL PRN
Status: DISCONTINUED | OUTPATIENT
Start: 2017-07-13 | End: 2017-07-13

## 2017-07-13 RX ORDER — DEXAMETHASONE SODIUM PHOSPHATE 10 MG/ML
INJECTION, SOLUTION INTRAMUSCULAR; INTRAVENOUS PRN
Status: DISCONTINUED | OUTPATIENT
Start: 2017-07-13 | End: 2017-07-13

## 2017-07-13 RX ORDER — CEFAZOLIN SODIUM 2 G/100ML
2 INJECTION, SOLUTION INTRAVENOUS
Status: COMPLETED | OUTPATIENT
Start: 2017-07-13 | End: 2017-07-13

## 2017-07-13 RX ORDER — NEOSTIGMINE METHYLSULFATE 1 MG/ML
VIAL (ML) INJECTION PRN
Status: DISCONTINUED | OUTPATIENT
Start: 2017-07-13 | End: 2017-07-13

## 2017-07-13 RX ORDER — HYDROCODONE BITARTRATE AND ACETAMINOPHEN 5; 325 MG/1; MG/1
1-2 TABLET ORAL ONCE
Status: COMPLETED | OUTPATIENT
Start: 2017-07-13 | End: 2017-07-13

## 2017-07-13 RX ORDER — ONDANSETRON 2 MG/ML
4 INJECTION INTRAMUSCULAR; INTRAVENOUS EVERY 30 MIN PRN
Status: DISCONTINUED | OUTPATIENT
Start: 2017-07-13 | End: 2017-07-13 | Stop reason: HOSPADM

## 2017-07-13 RX ORDER — PROPOFOL 10 MG/ML
INJECTION, EMULSION INTRAVENOUS PRN
Status: DISCONTINUED | OUTPATIENT
Start: 2017-07-13 | End: 2017-07-13

## 2017-07-13 RX ORDER — MEPERIDINE HYDROCHLORIDE 25 MG/ML
12.5 INJECTION INTRAMUSCULAR; INTRAVENOUS; SUBCUTANEOUS
Status: DISCONTINUED | OUTPATIENT
Start: 2017-07-13 | End: 2017-07-13 | Stop reason: HOSPADM

## 2017-07-13 RX ORDER — EPHEDRINE SULFATE 50 MG/ML
INJECTION, SOLUTION INTRAMUSCULAR; INTRAVENOUS; SUBCUTANEOUS PRN
Status: DISCONTINUED | OUTPATIENT
Start: 2017-07-13 | End: 2017-07-13

## 2017-07-13 RX ORDER — GLYCOPYRROLATE 0.2 MG/ML
INJECTION, SOLUTION INTRAMUSCULAR; INTRAVENOUS PRN
Status: DISCONTINUED | OUTPATIENT
Start: 2017-07-13 | End: 2017-07-13

## 2017-07-13 RX ORDER — HYDROCODONE BITARTRATE AND ACETAMINOPHEN 5; 325 MG/1; MG/1
1-2 TABLET ORAL EVERY 4 HOURS PRN
Qty: 30 TABLET | Refills: 0 | Status: SHIPPED | OUTPATIENT
Start: 2017-07-13 | End: 2021-04-28

## 2017-07-13 RX ORDER — FENTANYL CITRATE 50 UG/ML
INJECTION, SOLUTION INTRAMUSCULAR; INTRAVENOUS PRN
Status: DISCONTINUED | OUTPATIENT
Start: 2017-07-13 | End: 2017-07-13

## 2017-07-13 RX ORDER — NALOXONE HYDROCHLORIDE 0.4 MG/ML
.1-.4 INJECTION, SOLUTION INTRAMUSCULAR; INTRAVENOUS; SUBCUTANEOUS
Status: DISCONTINUED | OUTPATIENT
Start: 2017-07-13 | End: 2017-07-13 | Stop reason: HOSPADM

## 2017-07-13 RX ORDER — ONDANSETRON 2 MG/ML
INJECTION INTRAMUSCULAR; INTRAVENOUS PRN
Status: DISCONTINUED | OUTPATIENT
Start: 2017-07-13 | End: 2017-07-13

## 2017-07-13 RX ADMIN — FENTANYL CITRATE 50 MCG: 50 INJECTION, SOLUTION INTRAMUSCULAR; INTRAVENOUS at 08:52

## 2017-07-13 RX ADMIN — NEOSTIGMINE METHYLSULFATE 4 MG: 1 INJECTION INTRAMUSCULAR; INTRAVENOUS; SUBCUTANEOUS at 10:26

## 2017-07-13 RX ADMIN — Medication 5 MG: at 09:02

## 2017-07-13 RX ADMIN — FENTANYL CITRATE 50 MCG: 50 INJECTION, SOLUTION INTRAMUSCULAR; INTRAVENOUS at 11:10

## 2017-07-13 RX ADMIN — GLYCOPYRROLATE 0.7 MG: 0.2 INJECTION, SOLUTION INTRAMUSCULAR; INTRAVENOUS at 10:26

## 2017-07-13 RX ADMIN — PROPOFOL 200 MG: 10 INJECTION, EMULSION INTRAVENOUS at 08:52

## 2017-07-13 RX ADMIN — ROCURONIUM BROMIDE 10 MG: 10 INJECTION INTRAVENOUS at 09:27

## 2017-07-13 RX ADMIN — SODIUM CHLORIDE, POTASSIUM CHLORIDE, SODIUM LACTATE AND CALCIUM CHLORIDE: 600; 310; 30; 20 INJECTION, SOLUTION INTRAVENOUS at 08:04

## 2017-07-13 RX ADMIN — FENTANYL CITRATE 50 MCG: 50 INJECTION, SOLUTION INTRAMUSCULAR; INTRAVENOUS at 09:17

## 2017-07-13 RX ADMIN — CEFAZOLIN SODIUM 2 G: 2 INJECTION, SOLUTION INTRAVENOUS at 08:58

## 2017-07-13 RX ADMIN — SODIUM CHLORIDE, POTASSIUM CHLORIDE, SODIUM LACTATE AND CALCIUM CHLORIDE: 600; 310; 30; 20 INJECTION, SOLUTION INTRAVENOUS at 10:25

## 2017-07-13 RX ADMIN — DEXAMETHASONE SODIUM PHOSPHATE 10 MG: 10 INJECTION, SOLUTION INTRAMUSCULAR; INTRAVENOUS at 09:01

## 2017-07-13 RX ADMIN — PROPOFOL 30 MG: 10 INJECTION, EMULSION INTRAVENOUS at 10:30

## 2017-07-13 RX ADMIN — HYDROMORPHONE HYDROCHLORIDE 0.5 MG: 1 INJECTION, SOLUTION INTRAMUSCULAR; INTRAVENOUS; SUBCUTANEOUS at 11:21

## 2017-07-13 RX ADMIN — HYDROCODONE BITARTRATE AND ACETAMINOPHEN 2 TABLET: 5; 325 TABLET ORAL at 12:16

## 2017-07-13 RX ADMIN — PROPOFOL 50 MG: 10 INJECTION, EMULSION INTRAVENOUS at 10:26

## 2017-07-13 RX ADMIN — LIDOCAINE HYDROCHLORIDE 40 MG: 20 INJECTION, SOLUTION INFILTRATION; PERINEURAL at 08:52

## 2017-07-13 RX ADMIN — FENTANYL CITRATE 50 MCG: 50 INJECTION, SOLUTION INTRAMUSCULAR; INTRAVENOUS at 10:56

## 2017-07-13 RX ADMIN — FENTANYL CITRATE 50 MCG: 50 INJECTION, SOLUTION INTRAMUSCULAR; INTRAVENOUS at 10:26

## 2017-07-13 RX ADMIN — Medication 100 MG: at 08:52

## 2017-07-13 RX ADMIN — FENTANYL CITRATE 50 MCG: 50 INJECTION, SOLUTION INTRAMUSCULAR; INTRAVENOUS at 10:28

## 2017-07-13 RX ADMIN — MIDAZOLAM HYDROCHLORIDE 2 MG: 1 INJECTION, SOLUTION INTRAMUSCULAR; INTRAVENOUS at 08:49

## 2017-07-13 RX ADMIN — ROCURONIUM BROMIDE 20 MG: 10 INJECTION INTRAVENOUS at 09:00

## 2017-07-13 RX ADMIN — ONDANSETRON 4 MG: 2 INJECTION INTRAMUSCULAR; INTRAVENOUS at 09:01

## 2017-07-13 NOTE — IP AVS SNAPSHOT
[facility]    [address] [address]    Phone:  [phone]                                       After Visit Summary   7/13/2017    Vniny Hsu    MRN: [id number]           After Visit Summary Signature Page     I have received my discharge instructions, and my questions have been answered. I have discussed any challenges I see with this plan with the nurse or doctor.    ..........................................................................................................................................  Patient/Patient Representative Signature      ..........................................................................................................................................  Patient Representative Print Name and Relationship to Patient    ..................................................               ................................................  Date                                            Time    ..........................................................................................................................................  Reviewed by Signature/Title    ...................................................              ..............................................  Date                                                            Time

## 2017-07-13 NOTE — INTERVAL H&P NOTE
History and physical reviewed. Patient examined. There is no interval change in condition since examined by me 6-21-17.    Bridget Hemphill MD, FACS    7/13/2017  8:08 AM

## 2017-07-13 NOTE — IP AVS SNAPSHOT
MRN:[id number]                      After Visit Summary   7/13/2017    Vinny Hsu    MRN: [id number]           Thank you!     Thank you for choosing [facility] for your care. Our goal is always to provide you with excellent care. Hearing back from our patients is one way we can continue to improve our services. Please take a few minutes to complete the written survey that you may receive in the mail after you visit with us. Thank you!        Patient Information     Date Of Birth          1965        About your hospital stay     You were admitted on:  July 13, 2017 You last received care in the:  [facility] Preop/Phase II    You were discharged on:  July 13, 2017       Who to Call     For medical emergencies, please call 911.  For non-urgent questions about your medical care, please call your primary care provider or clinic, [phone]  For questions related to your surgery, please call your surgery clinic        Attending Provider     Provider Specialty    Brdiget Hemphill MD General Surgery       Primary Care Provider Office Phone # Fax #    Elijah DaimondDO juve [phone]      Further instructions from your care team           Post-Anesthesia Patient Instructions    IMMEDIATELY FOLLOWING SURGERY:  Do not drive or operate machinery for the first twenty four hours after surgery.  Do not make any important decisions for twenty four hours after surgery or while taking narcotic pain medications or sedatives.  If you develop intractable nausea and vomiting or a severe headache please notify your doctor immediately.    FOLLOW-UP:  Please make an appointment with your surgeon as instructed. You do not need to follow up with anesthesia unless specifically instructed to do so.    WOUND CARE INSTRUCTIONS (if applicable):  Keep a dry clean dressing on the anesthesia/puncture wound site if there is drainage.  Once the wound has quit draining you may leave it open to air.  Generally  "you should leave the bandage intact for twenty four hours unless there is drainage.  If the epidural site drains for more than 36-48 hours please call the anesthesia department.    QUESTIONS?:  Please feel free to call your physician or the hospital  if you have any questions, and they will be happy to assist you.       Pending Results     No orders found from 2017 to 2017.            Admission Information     Date & Time Provider Department Dept. Phone    2017 Bridget Hemphill MD [facility] Preop/Phase [phone]      Your Vitals Were     Blood Pressure Temperature Respirations Pulse Oximetry          113/64 97  F (36.1  C) (Oral) 16 97%        MyChart Information     [facility] lets you send messages to your doctor, view your test results, renew your prescriptions, schedule appointments and more. To sign up, go to [email or web address] . Click on \"Log in\" on the left side of the screen, which will take you to the Welcome page. Then click on \"Sign up Now\" on the right side of the page.     You will be asked to enter the access code listed below, as well as some personal information. Please follow the directions to create your username and password.     Your access code is: [email or web address]  Expires: 2017  2:11 PM     Your access code will  in 90 days. If you need help or a new code, please call your [place] clinic or [phone].        Care EveryWhere ID     This is your Care EveryWhere ID. This could be used by other organizations to access your [place] medical records  [id number]        Equal Access to Services     [facility]: Hadii sarina reese hadbrandon Sokirk, waaxda luqadaha, qaybta kaalmada sami luu . So [facility] [phone].    ATENCIÓN: Si habla español, tiene a salcedo disposición servicios gratuitos de asistencia lingüística. Llame al [phone].    We comply with applicable federal civil rights laws and [place] laws. We do not " discriminate on the basis of race, color, national origin, age, disability sex, sexual orientation or gender identity.               Review of your medicines      START taking        Dose / Directions    cefadroxil 500 MG capsule   Commonly known as:  DURICEF   Used for:  Status post inguinal hernia repair using synthetic patch        Dose:  500 mg   Take 1 capsule (500 mg) by mouth 2 times daily   Quantity:  6 capsule   Refills:  0       HYDROcodone-acetaminophen 5-325 MG per tablet   Commonly known as:  NORCO   Used for:  Status post inguinal hernia repair using synthetic patch        Dose:  1-2 tablet   Take 1-2 tablets by mouth every 4 hours as needed for moderate to severe pain or pain maximum 10 tablet(s) per day   Quantity:  30 tablet   Refills:  0         CONTINUE these medicines which have NOT CHANGED        Dose / Directions    atorvastatin 20 MG tablet   Commonly known as:  LIPITOR   Used for:  Mixed hyperlipidemia        Dose:  20 mg   Take 1 tablet (20 mg) by mouth daily   Quantity:  90 tablet   Refills:  3       buPROPion 150 MG 24 hr tablet   Commonly known as:  WELLBUTRIN XL   Indication:  Major Depressive Disorder   Used for:  Major depressive disorder, recurrent episode, moderate (H), Mood disorder (H)        Dose:  150 mg   Take 1 tablet (150 mg) by mouth daily   Quantity:  90 tablet   Refills:  5       BusPIRone HCl 30 MG Tabs   Indication:  Anxiety Disorder   Used for:  MARY (generalized anxiety disorder)        Dose:  30 mg   Take 30 mg by mouth 2 times daily   Quantity:  180 tablet   Refills:  3       gabapentin 300 MG capsule   Commonly known as:  NEURONTIN   Indication:  Social Anxiety Disorder   Used for:  Mood disorder (H)        ONE CAP Q AM, TWO CAPS QHS   Quantity:  270 capsule   Refills:  3       lamoTRIgine 25 MG tablet   Commonly known as:  LaMICtal   Used for:  Mood disorder (H)        Take one tablet by mouth daily for 10 days, then take 2 tablets daily for 10 days, then take 4  tablets per daily.   Quantity:  300 tablet   Refills:  3       Levothyroxine Sodium 88 MCG Caps   Indication:  Underactive Thyroid   Used for:  Acquired hypothyroidism        Dose:  88 mcg   Take 88 mcg by mouth daily   Quantity:  90 capsule   Refills:  3       sertraline 100 MG tablet   Commonly known as:  ZOLOFT   Indication:  Anxiety Disorder, Major Depressive Disorder   Used for:  Major depressive disorder, recurrent episode, moderate (H)        Dose:  200 mg   Take 2 tablets (200 mg) by mouth daily   Quantity:  180 tablet   Refills:  3       traZODone 50 MG tablet   Commonly known as:  DESYREL   Indication:  Anxiety Disorder   Used for:  Persistent insomnia        Dose:  50 mg   Take 1 tablet (50 mg) by mouth At Bedtime MAY TAKE ADDITIONAL DOSE AS NEEDED   Quantity:  100 tablet   Refills:  3            Where to get your medicines      These medications were sent to [facility] - [address]  [address] [address]     Phone:  [phone]     cefadroxil 500 MG capsule         Some of these will need a paper prescription and others can be bought over the counter. Ask your nurse if you have questions.     Bring a paper prescription for each of these medications     HYDROcodone-acetaminophen 5-325 MG per tablet                Protect others around you: Learn how to safely use, store and throw away your medicines at www.disposemymeds.org.             Medication List: This is a list of all your medications and when to take them. Check marks below indicate your daily home schedule. Keep this list as a reference.      Medications           Morning Afternoon Evening Bedtime As Needed    atorvastatin 20 MG tablet   Commonly known as:  LIPITOR   Take 1 tablet (20 mg) by mouth daily                                buPROPion 150 MG 24 hr tablet   Commonly known as:  WELLBUTRIN XL   Take 1 tablet (150 mg) by mouth daily                                BusPIRone HCl 30 MG Tabs   Take  30 mg by mouth 2 times daily                                cefadroxil 500 MG capsule   Commonly known as:  DURICEF   Take 1 capsule (500 mg) by mouth 2 times daily                                gabapentin 300 MG capsule   Commonly known as:  NEURONTIN   ONE CAP Q AM, TWO CAPS QHS                                HYDROcodone-acetaminophen 5-325 MG per tablet   Commonly known as:  NORCO   Take 1-2 tablets by mouth every 4 hours as needed for moderate to severe pain or pain maximum 10 tablet(s) per day   Last time this was given:  2 tablets on 7/13/2017 12:16 PM                                lamoTRIgine 25 MG tablet   Commonly known as:  LaMICtal   Take one tablet by mouth daily for 10 days, then take 2 tablets daily for 10 days, then take 4 tablets per daily.                                Levothyroxine Sodium 88 MCG Caps   Take 88 mcg by mouth daily                                sertraline 100 MG tablet   Commonly known as:  ZOLOFT   Take 2 tablets (200 mg) by mouth daily                                traZODone 50 MG tablet   Commonly known as:  DESYREL   Take 1 tablet (50 mg) by mouth At Bedtime MAY TAKE ADDITIONAL DOSE AS NEEDED

## 2017-07-13 NOTE — PHARMACY
[facility]    Pharmacy      Antimicrobial Stewardship Note     Current antimicrobial therapy:  Anti-infectives (Future)    Start     Dose/Rate Route Frequency Ordered Stop    07/13/17 0714  ceFAZolin (ANCEF) intermittent infusion 1 g      1 g  over 30 Minutes Intravenous SEE ADMIN INSTRUCTIONS 07/13/17 0714            Indication: Surgical prophylaxis     Pertinent labs:  Creatinine   Creatinine   Date Value Ref Range Status   05/24/2017 1.01 0.66 - 1.25 mg/dL Final   01/20/2017 0.97 0.66 - 1.25 mg/dL Final     WBC   WBC   Date Value Ref Range Status   05/24/2017 8.6 4.0 - 11.0 10e9/L Final   01/20/2017 9.0 4.0 - 11.0 10e9/L Final     ANC No components found for: ANC     Culture Results: None performed     Recommendations/Interventions:  1. No recommendations at this time; current antimicrobial pharmacotherapy is appropriate.    Sotero Bauer [facility]  July 13, 2017

## 2017-07-13 NOTE — ANESTHESIA POSTPROCEDURE EVALUATION
Patient: Vinny Hsu    Procedure(s):  LAPAROSCOPIC REPAIR BILATERAL INGUINAL HERNIA'S - Wound Class: I-Clean    Diagnosis:BILATERAL INGUINAL HERNIA'S  Diagnosis Additional Information: No value filed.    Anesthesia Type:  General, ETT    Note:  Anesthesia Post Evaluation    Patient location during evaluation: PACU  Patient participation: Able to fully participate in evaluation  Level of consciousness: awake and alert  Pain management: adequate  Airway patency: patent  Cardiovascular status: acceptable  Respiratory status: acceptable  Hydration status: acceptable  PONV: none     Anesthetic complications: None          Last vitals:  Vitals:    07/13/17 1050 07/13/17 1055 07/13/17 1100   BP: 132/68 125/66 122/72   Resp: 18 20 15   Temp:      SpO2: 96% 98% 96%         Electronically Signed By: Fab Muse MD  July 13, 2017  11:13 AM

## 2017-07-13 NOTE — OR NURSING
DIscharge instructions discussed and sent home with Pt and wife. Eating, drinking, and tolerating well. Medicated with Norco 5/325 PO x2 once prior to discharge. Pt assisted with dressing, and brought to bathroom with SBA. Escorted to exit via wheelchair.
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
## 2017-07-13 NOTE — OR NURSING
C/o dull right chest pain.Cardiac monitor NSR w/o ectopy. in to see and evalute.Med with sublimaze 50mcg iv.

## 2017-07-13 NOTE — OP NOTE
PREOPERATIVE DIAGNOSIS:  Bilateral inguinal hernias.      POSTOPERATIVE DIAGNOSES:   1.  Indirect right inguinal hernia.   2.  Indirect left inguinal hernia       HISTORY:   This 51-year-old male developed groin discomfort and examination confirmed the presence of bilateral inguinal hernias.  Laparoscopic repair selected.      OPERATIVE FINDINGS:  There were bilateral indirect inguinal hernias     PROCEDURE:  With the patient in the supine position on the operating table, general anesthesia was induced.   A anderson catheter was inserted; the abdomen was prepped and draped sterilely and the requisite timeout pause was observed during which the patient's correct identity and planned procedure was confirmed by the operating room personnel in attendance.  A transverse incision was made inferior and to the left of the umbilicus and carried through full thickness skin and subcutaneous tissue with bleeding controlled by electrocautery.  The anterior fascia overlying the rectus sheath was incised and using blunt dissection, the retrorectus space was entered and developed with digital blunt dissection.  The balloon insufflator was introduced and directed to the symphysis.  The operating camera was introduced and under direct vision, insufflation was accomplished with 40 pumps of the bulb to expose the extraperitoneal space.  The balloon was removed and CO2 affixed to 15 mmHg pressure.  The balloon was inflated with 20 mL to seat the port inferior to the rectus fascia.  Two 5 mm ports were placed, the first two fingerbreadths above the symphysis and the second midway between the aforementioned ports in the midline.  Attention was directed to the left groin.  Using blunt dissection, the landmarks were cleared of surrounding tissue and fat.  José's ligament was exposed and the inferior epigastric vessels identified.  Lipoma of the cord was delivered and retracted cephalad.  A small indirect sac was similarly retracted cephalad.   With complete clearance of the inguinal floor on the left, the right side was approached.  A larger indirect sac was identified, the cord structures skeletinized, lipoma retracted and the peritoneum retracted cephalad.  With complete dissection identical to the left, the two trimmed Parietex ProGrip mesh prostheses were introduced sequentially and placed to satisfactorily cover each defect well beyond the midline, 3 cm cephalad the position that a direct defect could develop and laterally to and beyond José's ligament.   The Parietex ProGrip mesh was introduced, placed in the specific location to extend to and beyond the midline where the left-sided graft was placed.  The mesh was unrolled again at least 3 cm cephalad to the location that a direct defect could occur and laterally beyond José's ligament and to the anterior superior iliac spine.  There were no bleeding points and attention was again re-directed to the right side where the peritoneal reflection was retracted cephalad so that it could not serve as the leading edge of recurrence.  With both sided peritoneal reflections held, carbon dioxide was aspirated under direct vision to confirm excellent seating of the grafts.  The instruments and trocars were removed and all wounds were irrigated with antibiotic/saline solution and anesthetized with 0.25% Marcaine with epinephrine.  The rectus fascia was closed with running 0 PDS.  The subcutaneous tissues were again irrigated with antibiotic/saline solution and closed with interrupted 3-0 Vicryl.  The skin was closed with subdermal 3-0 Vicryl reinforced with Dermabond.  Sterile dressings were applied and the patient was transported to the recovery room in satisfactory condition.  The estimated blood loss was minimal and there were no apparent complications.  The procedure was satisfactorily tolerated and there were no complications identified at the completion.  The final sponge, needle and instrument counts  were reported correct and the post surgical debriefing was held and acknowledged at completion.        XANDER SALAMANCA MD, FACS

## 2017-07-26 ENCOUNTER — OFFICE VISIT (OUTPATIENT)
Dept: SURGERY | Facility: OTHER | Age: 52
End: 2017-07-26
Attending: SURGERY
Payer: MEDICARE

## 2017-07-26 VITALS
HEART RATE: 68 BPM | SYSTOLIC BLOOD PRESSURE: 115 MMHG | OXYGEN SATURATION: 96 % | HEIGHT: 69 IN | WEIGHT: 175 LBS | TEMPERATURE: 97.8 F | DIASTOLIC BLOOD PRESSURE: 65 MMHG | RESPIRATION RATE: 18 BRPM | BODY MASS INDEX: 25.92 KG/M2

## 2017-07-26 DIAGNOSIS — Z98.890 STATUS POST LAPAROSCOPIC HERNIA REPAIR: Primary | ICD-10-CM

## 2017-07-26 DIAGNOSIS — Z87.19 STATUS POST LAPAROSCOPIC HERNIA REPAIR: Primary | ICD-10-CM

## 2017-07-26 PROCEDURE — 99212 OFFICE O/P EST SF 10 MIN: CPT

## 2017-07-26 PROCEDURE — 99024 POSTOP FOLLOW-UP VISIT: CPT | Performed by: SURGERY

## 2017-07-26 NOTE — PATIENT INSTRUCTIONS
Thank you for allowing Dr Hemphill and our surgical team to participate in your care. Please call with any questions or concerns to our direct line at [phone] or [phone].      Please contact the office or return for questions, concerns or new complaints.

## 2017-07-26 NOTE — PROGRESS NOTES
"  HPI:  Returns for first post surgical examination following Bilateral laparoscopic hernia repair 7/13/17 without complaint.  Denies wound complication, fever, chills, nausea or vomiting.  ROS:   10 point ROS neg other than the symptoms noted above in the HPI.    Examination:  /65 (BP Location: Right arm, Patient Position: Chair, Cuff Size: Adult Regular)  Pulse 68  Temp 97.8  F (36.6  C) (Tympanic)  Resp 18  Ht 5' 9\" (1.753 m)  Wt 175 lb (79.4 kg)  SpO2 96%  BMI 25.84 kg/m2    Constitutional: healthy, alert and no distress    Wounds healing nicely without evidence of infection.  Repairs intact without defect but fluid is palpable anteriorly right side which is predictable due to the size of the original hernia.  Impression:  Satisfactory course.  Recommendations:  The technical aspects of the procedure and operative findings were reviewed. He was again reminding in the need to refrain from heavy lifting and strenuous activity for a total of 6 weeks following this procedure.  Expectations regarding fluid resorption outlined.  Bridget Hemphill MD, FACS    7/26/2017  2:32 PM             "

## 2017-07-26 NOTE — NURSING NOTE
"Chief Complaint   Patient presents with     Surgical Followup     s/p bilateral ingueral hernia repair 7/13/17       Initial /65 (BP Location: Right arm, Patient Position: Chair, Cuff Size: Adult Regular)  Pulse 68  Temp 97.8  F (36.6  C) (Tympanic)  Resp 18  Ht 5' 9\" (1.753 m)  Wt 175 lb (79.4 kg)  SpO2 96%  BMI 25.84 kg/m2 Estimated body mass index is 25.84 kg/(m^2) as calculated from the following:    Height as of this encounter: 5' 9\" (1.753 m).    Weight as of this encounter: 175 lb (79.4 kg).  Medication Reconciliation: complete   Marie Esposito      "

## 2017-07-26 NOTE — MR AVS SNAPSHOT
"              After Visit Summary   7/26/2017    Vinny Hsu    MRN: [id number]           Patient Information     Date Of Birth          1965        Visit Information        Provider Department      7/26/2017 2:30 PM Bridget Hemphill MD [facility]        Care Instructions    Thank you for allowing Dr Hemphill and our surgical team to participate in your care. Please call with any questions or concerns to our direct line at [phone] or [phone].      Please contact the office or return for questions, concerns or new complaints.            Follow-ups after your visit        Who to contact     If you have questions or need follow up information about today's clinic visit or your schedule please contact [facility] directly at [phone].  Normal or non-critical lab and imaging results will be communicated to you by MyChart, letter or phone within 4 business days after the clinic has received the results. If you do not hear from us within 7 days, please contact the clinic through MyChart or phone. If you have a critical or abnormal lab result, we will notify you by phone as soon as possible.  Submit refill requests through [employer] or call your pharmacy and they will forward the refill request to us. Please allow 3 business days for your refill to be completed.          Additional Information About Your Visit        [employer] Information     [employer] lets you send messages to your doctor, view your test results, renew your prescriptions, schedule appointments and more. To sign up, go to [email or web address] . Click on \"Log in\" on the left side of the screen, which will take you to the Welcome page. Then click on \"Sign up Now\" on the right side of the page.     You will be asked to enter the access code listed below, as well as some personal information. Please follow the directions to create your username and password.     Your access code is: [email or web address]  Expires: " "2017  2:11 PM     Your access code will  in 90 days. If you need help or a new code, please call your [place] clinic or [phone].        Care EveryWhere ID     This is your Care EveryWhere ID. This could be used by other organizations to access your [place] medical records  [id number]        Your Vitals Were     Pulse Temperature Respirations Height Pulse Oximetry BMI (Body Mass Index)    68 97.8  F (36.6  C) (Tympanic) 18 5' 9\" (1.753 m) 96% 25.84 kg/m2       Blood Pressure from Last 3 Encounters:   17 115/65   17 110/67   17 132/62    Weight from Last 3 Encounters:   17 175 lb (79.4 kg)   17 175 lb (79.4 kg)   17 172 lb (78 kg)              Today, you had the following     No orders found for display       Primary Care Provider Office Phone # Fax #    Elijah Martin Villaseñor [phone]       [facility] [place] [address]  [place] [address]        Equal Access to Services     AMADA CHRISTENSEN AH: Hadii aad ku hadasho Soomaali, waaxda luqadaha, qaybta kaalmada adeegyada, waxay dereckin hayaan ademargarita tony lawilliams ah. So [facility] [phone].    ATENCIÓN: Si habla español, tiene a salcedo disposición servicios gratuitos de asistencia lingüística. Llame al [phone].    We comply with applicable federal civil rights laws and [place] laws. We do not discriminate on the basis of race, color, national origin, age, disability sex, sexual orientation or gender identity.            Thank you!     Thank you for choosing [facility] [place]  for your care. Our goal is always to provide you with excellent care. Hearing back from our patients is one way we can continue to improve our services. Please take a few minutes to complete the written survey that you may receive in the mail after your visit with us. Thank you!             Your Updated Medication List - Protect others around you: Learn how to safely use, store and throw away your medicines at " www.disposemymeds.org.          This list is accurate as of: 7/26/17  2:38 PM.  Always use your most recent med list.                   Brand Name Dispense Instructions for use Diagnosis    atorvastatin 20 MG tablet    LIPITOR    90 tablet    Take 1 tablet (20 mg) by mouth daily    Mixed hyperlipidemia       buPROPion 150 MG 24 hr tablet    WELLBUTRIN XL    90 tablet    Take 1 tablet (150 mg) by mouth daily    Major depressive disorder, recurrent episode, moderate (H), Mood disorder (H)       BusPIRone HCl 30 MG Tabs     180 tablet    Take 30 mg by mouth 2 times daily    MARY (generalized anxiety disorder)       gabapentin 300 MG capsule    NEURONTIN    270 capsule    ONE CAP Q AM, TWO CAPS QHS    Mood disorder (H)       HYDROcodone-acetaminophen 5-325 MG per tablet    NORCO    30 tablet    Take 1-2 tablets by mouth every 4 hours as needed for moderate to severe pain or pain maximum 10 tablet(s) per day    Status post inguinal hernia repair using synthetic patch       lamoTRIgine 25 MG tablet    LaMICtal    300 tablet    Take one tablet by mouth daily for 10 days, then take 2 tablets daily for 10 days, then take 4 tablets per daily.    Mood disorder (H)       Levothyroxine Sodium 88 MCG Caps     90 capsule    Take 88 mcg by mouth daily    Acquired hypothyroidism       sertraline 100 MG tablet    ZOLOFT    180 tablet    Take 2 tablets (200 mg) by mouth daily    Major depressive disorder, recurrent episode, moderate (H)       traZODone 50 MG tablet    DESYREL    100 tablet    Take 1 tablet (50 mg) by mouth At Bedtime MAY TAKE ADDITIONAL DOSE AS NEEDED    Persistent insomnia

## 2018-02-15 DIAGNOSIS — G47.00 PERSISTENT INSOMNIA: ICD-10-CM

## 2018-02-15 RX ORDER — TRAZODONE HYDROCHLORIDE 50 MG/1
50 TABLET, FILM COATED ORAL AT BEDTIME
Qty: 100 TABLET | Refills: 3 | Status: SHIPPED | OUTPATIENT
Start: 2018-02-15 | End: 2020-02-12

## 2018-04-05 ENCOUNTER — OFFICE VISIT (OUTPATIENT)
Dept: PEDIATRICS | Facility: OTHER | Age: 53
End: 2018-04-05
Attending: INTERNAL MEDICINE
Payer: MEDICARE

## 2018-04-05 VITALS
BODY MASS INDEX: 25.92 KG/M2 | DIASTOLIC BLOOD PRESSURE: 74 MMHG | HEIGHT: 69 IN | TEMPERATURE: 96.9 F | SYSTOLIC BLOOD PRESSURE: 102 MMHG | HEART RATE: 66 BPM | WEIGHT: 175 LBS

## 2018-04-05 DIAGNOSIS — G24.01 TARDIVE DYSKINESIA: ICD-10-CM

## 2018-04-05 DIAGNOSIS — E03.9 ACQUIRED HYPOTHYROIDISM: Primary | ICD-10-CM

## 2018-04-05 DIAGNOSIS — F95.2 TOURETTES SYNDROME: ICD-10-CM

## 2018-04-05 DIAGNOSIS — F41.1 GAD (GENERALIZED ANXIETY DISORDER): ICD-10-CM

## 2018-04-05 DIAGNOSIS — F39 MOOD DISORDER (H): ICD-10-CM

## 2018-04-05 LAB
T4 FREE SERPL-MCNC: 0.81 NG/DL (ref 0.76–1.46)
TSH SERPL DL<=0.005 MIU/L-ACNC: 2.35 MU/L (ref 0.4–4)

## 2018-04-05 PROCEDURE — 84439 ASSAY OF FREE THYROXINE: CPT | Mod: ZL | Performed by: INTERNAL MEDICINE

## 2018-04-05 PROCEDURE — 84443 ASSAY THYROID STIM HORMONE: CPT | Mod: ZL | Performed by: INTERNAL MEDICINE

## 2018-04-05 PROCEDURE — 99214 OFFICE O/P EST MOD 30 MIN: CPT | Performed by: INTERNAL MEDICINE

## 2018-04-05 PROCEDURE — G0463 HOSPITAL OUTPT CLINIC VISIT: HCPCS

## 2018-04-05 PROCEDURE — 36415 COLL VENOUS BLD VENIPUNCTURE: CPT | Mod: ZL | Performed by: INTERNAL MEDICINE

## 2018-04-05 ASSESSMENT — ANXIETY QUESTIONNAIRES
5. BEING SO RESTLESS THAT IT IS HARD TO SIT STILL: NEARLY EVERY DAY
2. NOT BEING ABLE TO STOP OR CONTROL WORRYING: NEARLY EVERY DAY
7. FEELING AFRAID AS IF SOMETHING AWFUL MIGHT HAPPEN: NEARLY EVERY DAY
4. TROUBLE RELAXING: NEARLY EVERY DAY
1. FEELING NERVOUS, ANXIOUS, OR ON EDGE: NEARLY EVERY DAY
GAD7 TOTAL SCORE: 21
IF YOU CHECKED OFF ANY PROBLEMS ON THIS QUESTIONNAIRE, HOW DIFFICULT HAVE THESE PROBLEMS MADE IT FOR YOU TO DO YOUR WORK, TAKE CARE OF THINGS AT HOME, OR GET ALONG WITH OTHER PEOPLE: EXTREMELY DIFFICULT
6. BECOMING EASILY ANNOYED OR IRRITABLE: NEARLY EVERY DAY
3. WORRYING TOO MUCH ABOUT DIFFERENT THINGS: NEARLY EVERY DAY

## 2018-04-05 ASSESSMENT — PAIN SCALES - GENERAL: PAINLEVEL: NO PAIN (0)

## 2018-04-05 NOTE — MR AVS SNAPSHOT
"              After Visit Summary   2018    Vinny Hsu    MRN: [id number]           Patient Information     Date Of Birth          1965        Visit Information        Provider Department      2018 3:40 PM Elijah Villaseñor, DO [facility]        Today's Diagnoses     Acquired hypothyroidism    -  1    MARY (generalized anxiety disorder)        Tourettes syndrome        Tardive dyskinesia        Mood disorder (H)           Follow-ups after your visit        Who to contact     If you have questions or need follow up information about today's clinic visit or your schedule please contact [facility] directly at [phone].  Normal or non-critical lab and imaging results will be communicated to you by MyChart, letter or phone within 4 business days after the clinic has received the results. If you do not hear from us within 7 days, please contact the clinic through MyChart or phone. If you have a critical or abnormal lab result, we will notify you by phone as soon as possible.  Submit refill requests through [employer] or call your pharmacy and they will forward the refill request to us. Please allow 3 business days for your refill to be completed.          Additional Information About Your Visit        MyChart Information     [employer] lets you send messages to your doctor, view your test results, renew your prescriptions, schedule appointments and more. To sign up, go to [email or web address] . Click on \"Log in\" on the left side of the screen, which will take you to the Welcome page. Then click on \"Sign up Now\" on the right side of the page.     You will be asked to enter the access code listed below, as well as some personal information. Please follow the directions to create your username and password.     Your access code is: [email or web address]  Expires: 2018  8:43 PM     Your access code will  in 90 days. If you need help or a new code, please call your " "[facility] or [phone].        Care EveryWhere ID     This is your Care EveryWhere ID. This could be used by other organizations to access your [facility] medical records  [id number]        Your Vitals Were     Pulse Temperature Height BMI (Body Mass Index)          66 96.9  F (36.1  C) (Tympanic) 5' 9\" (1.753 m) 25.84 kg/m2         Blood Pressure from Last 3 Encounters:   04/05/18 102/74   07/26/17 115/65   07/13/17 110/67    Weight from Last 3 Encounters:   04/05/18 175 lb (79.4 kg)   07/26/17 175 lb (79.4 kg)   07/05/17 175 lb (79.4 kg)              We Performed the Following     T4 free     TSH          Today's Medication Changes          These changes are accurate as of 4/5/18 11:59 PM.  If you have any questions, ask your nurse or doctor.               Start taking these medicines.        Dose/Directions    tetrabenazine 12.5 MG tablet   Commonly known as:  XENAZINE   Used for:  Tourettes syndrome, Tardive dyskinesia   Started by:  Elijah Villaseñor DO        Dose:  12.5 mg   Take 1 tablet (12.5 mg) by mouth 2 times daily   Quantity:  90 tablet   Refills:  1         Stop taking these medicines if you haven't already. Please contact your care team if you have questions.     lamoTRIgine 25 MG tablet   Commonly known as:  LaMICtal   Stopped by:  Elijah Villaseñor DO                Where to get your medicines      These medications were sent to [facility] [place] - [address]  [address] [address]     Phone:  [phone]     tetrabenazine 12.5 MG tablet                Primary Care Provider Office Phone # Fax #    Elijah Villaseñor [phone] [address]  [place] [address]        Equal Access to Services     [facility] Center[facility]AYLIN : Tanesha Pugh, waaxda luqadaha, qaybta kaalsami de león . [facility] [phone].    ATENCIÓN: Si dontrell hayes, tiene a salcedo disposición " servicios gratuitos de asistencia lingüística. Gurinder valencia [phone].    We comply with applicable federal civil rights laws and [place] laws. We do not discriminate on the basis of race, color, national origin, age, disability, sex, sexual orientation, or gender identity.            Thank you!     Thank you for choosing [facility]  for your care. Our goal is always to provide you with excellent care. Hearing back from our patients is one way we can continue to improve our services. Please take a few minutes to complete the written survey that you may receive in the mail after your visit with us. Thank you!             Your Updated Medication List - Protect others around you: Learn how to safely use, store and throw away your medicines at www.disposemymeds.org.          This list is accurate as of 4/5/18 11:59 PM.  Always use your most recent med list.                   Brand Name Dispense Instructions for use Diagnosis    atorvastatin 20 MG tablet    LIPITOR    90 tablet    Take 1 tablet (20 mg) by mouth daily    Mixed hyperlipidemia       buPROPion 150 MG 24 hr tablet    WELLBUTRIN XL    90 tablet    Take 1 tablet (150 mg) by mouth daily    Major depressive disorder, recurrent episode, moderate (H), Mood disorder (H)       BusPIRone HCl 30 MG Tabs     180 tablet    Take 30 mg by mouth 2 times daily    MARY (generalized anxiety disorder)       gabapentin 300 MG capsule    NEURONTIN    270 capsule    ONE CAP Q AM, TWO CAPS QHS    Mood disorder (H)       HYDROcodone-acetaminophen 5-325 MG per tablet    NORCO    30 tablet    Take 1-2 tablets by mouth every 4 hours as needed for moderate to severe pain or pain maximum 10 tablet(s) per day    Status post inguinal hernia repair using synthetic patch       Levothyroxine Sodium 88 MCG Caps     90 capsule    Take 88 mcg by mouth daily    Acquired hypothyroidism       sertraline 100 MG tablet    ZOLOFT    180 tablet    Take 2 tablets (200 mg) by mouth daily     Major depressive disorder, recurrent episode, moderate (H)       tetrabenazine 12.5 MG tablet    XENAZINE    90 tablet    Take 1 tablet (12.5 mg) by mouth 2 times daily    Tourettes syndrome, Tardive dyskinesia       traZODone 50 MG tablet    DESYREL    100 tablet    Take 1 tablet (50 mg) by mouth At Bedtime MAY TAKE ADDITIONAL DOSE AS NEEDED    Persistent insomnia

## 2018-04-05 NOTE — PROGRESS NOTES
"Chief Complaint   Patient presents with     Thyroid Disease     Anxiety       Initial /74  Pulse 66  Temp 96.9  F (36.1  C) (Tympanic)  Ht 5' 9\" (1.753 m)  Wt 175 lb (79.4 kg)  BMI 25.84 kg/m2 Estimated body mass index is 25.84 kg/(m^2) as calculated from the following:    Height as of this encounter: 5' 9\" (1.753 m).    Weight as of this encounter: 175 lb (79.4 kg).  Medication Reconciliation: complete   Zuly Espinal      "

## 2018-04-05 NOTE — PROGRESS NOTES
SUBJECTIVE:   Vinny Hsu is a 52 year old male who presents to clinic today for the following health issues:      Hypothyroidism Follow-up      Since last visit, patient describes the following symptoms: Weight stable, no hair loss, no skin changes, no constipation, no loose stools, weight loss of 60 lbs, anxiety and hair loss      Amount of exercise or physical activity: None    Problems taking medications regularly: No    Medication side effects: none    Diet: regular (no restrictions)      Abnormal Mood Symptoms      Duration: 4-5 years    Description:  Depression: YES  Anxiety: YES  Panic attacks: YES     Accompanying signs and symptoms: see PHQ-9 and MARY scores    History (similar episodes/previous evaluation): yes    Precipitating or alleviating factors: Zanax    Therapies tried and outcome: none    He stopped Lamictal after one month due to anger and agitation.      -------------------------------------    Problem list and histories reviewed & adjusted, as indicated.  Additional history: as documented    Patient Active Problem List   Diagnosis     MARY (generalized anxiety disorder)     Acquired hypothyroidism     Past Surgical History:   Procedure Laterality Date     DENTAL SURGERY Bilateral     ALL UPPER TEETH GONE     LAPAROSCOPIC HERNIORRHAPHY INGUINAL Bilateral 7/13/2017    Procedure: LAPAROSCOPIC HERNIORRHAPHY INGUINAL;  LAPAROSCOPIC REPAIR BILATERAL INGUINAL HERNIA'S;  Surgeon: Bridget Hemphill MD;  Location: [facility] OR       Social History   Substance Use Topics     Smoking status: Current Every Day Smoker     Types: Cigars     Smokeless tobacco: Never Used     Alcohol use Yes     Family History   Problem Relation Age of Onset     Other Cancer Mother      Coronary Artery Disease Paternal Grandfather            Reviewed and updated as needed this visit by clinical staff  Tobacco  Allergies  Meds  Med Hx  Surg Hx  Fam Hx  Soc Hx      Reviewed and updated as needed this visit by Provider      "    ROS:  CONSTITUTIONAL: NEGATIVE for fever, chills, change in weight  EYES: NEGATIVE for vision changes or irritation  ENT/MOUTH: NEGATIVE for ear, mouth and throat problems  RESP: NEGATIVE for significant cough or SOB  CV: NEGATIVE for chest pain, palpitations or peripheral edema  GI: NEGATIVE for nausea, abdominal pain, heartburn, or change in bowel habits  : NEGATIVE for frequency, dysuria, or hematuria  NEURO: NEGATIVE for weakness, dizziness or paresthesias  NEURO: tremor worsened and TICS worsened due to stress due to manager asking for he and his wife to clean his apartment better.  The smell  In the apartment was from rotten meat in a cooler which was forgotten about.  This was followed by a eviction notice after [place] confronted her.  They have until the 30 th.  They have [place] [employer] is fighting the eviction  PSYCHIATRIC: NEGATIVE for changes in mood or affect    OBJECTIVE:     /74  Pulse 66  Temp 96.9  F (36.1  C) (Tympanic)  Ht 5' 9\" (1.753 m)  Wt 175 lb (79.4 kg)  BMI 25.84 kg/m2  Body mass index is 25.84 kg/(m^2).  GENERAL: healthy, alert and no distress  EYES: Eyes grossly normal to inspection and conjunctivae and sclerae normal  NECK: no adenopathy, no asymmetry, masses, or scars and thyroid normal to palpation  RESP: lungs clear to auscultation - no rales, rhonchi or wheezes  CV: regular rate and rhythm, normal S1 S2, no S3 or S4, no murmur, click or rub, no peripheral edema and peripheral pulses strong  ABDOMEN: soft, nontender, no hepatosplenomegaly, no masses and bowel sounds normal  ABDOMEN: no palpable or pulsatile masses and no bruits heard  PSYCH: mentation appears normal, affect normal/bright  PSYCH: mentation appears normal, anxious and body twisting and tics    Diagnostic Test Results:  Results for orders placed or performed in visit on 04/05/18   TSH   Result Value Ref Range    TSH 2.35 0.40 - 4.00 mU/L   T4 free   Result Value Ref Range    T4 Free 0.81 0.76 - 1.46 ng/dL "       ASSESSMENT/PLAN:   (F41.1) MARY (generalized anxiety disorder)  Comment: His anxiety has worsened and with this so has his tics  Plan:   He will continue Buspar 30 mg BID and mat consider going to 30 mg TID    (F95.2) Tourettes syndrome  Comment:   Plan:   tetrabenazine (XENAZINE) 12.5 MG tablet BID.  He has been advised to increase his frequency to TID if the effect of the medication is desirable.  Consider checking T3 levels    (G24.01) Tardive dyskinesia  Comment: This is most likely sever tics due to agitation and unlikely medications induced.  Plan:   tetrabenazine (XENAZINE) 12.5 MG tablet as above.    (E03.9) Acquired hypothyroidism  (primary encounter diagnosis)  Comment:  His thyroid labs are normal  Plan:   He will continue levothyroxine 88 mcg daily              FUTURE APPOINTMENTS:       - Follow-up visit in 3 weeks after starting Xenazine    Elijah Villaseñor DO, DO  [facility]

## 2018-04-06 ASSESSMENT — ANXIETY QUESTIONNAIRES: GAD7 TOTAL SCORE: 21

## 2018-04-06 ASSESSMENT — PATIENT HEALTH QUESTIONNAIRE - PHQ9: SUM OF ALL RESPONSES TO PHQ QUESTIONS 1-9: 18

## 2018-05-08 PROBLEM — F95.2 TOURETTES SYNDROME: Status: ACTIVE | Noted: 2018-04-05

## 2018-05-08 RX ORDER — TETRABENAZINE 12.5 MG/1
12.5 TABLET ORAL 2 TIMES DAILY
Qty: 90 TABLET | Refills: 1 | Status: SHIPPED | OUTPATIENT
Start: 2018-05-08 | End: 2018-05-09

## 2018-05-09 ENCOUNTER — TELEPHONE (OUTPATIENT)
Dept: PEDIATRICS | Facility: OTHER | Age: 53
End: 2018-05-09

## 2018-05-09 DIAGNOSIS — F95.2 TOURETTE SYNDROME: Primary | ICD-10-CM

## 2018-05-09 RX ORDER — RISPERIDONE 0.25 MG/1
0.25 TABLET ORAL DAILY
Qty: 30 TABLET | Refills: 1 | Status: SHIPPED | OUTPATIENT
Start: 2018-05-09 | End: 2019-05-07

## 2018-05-09 NOTE — TELEPHONE ENCOUNTER
Dr. Villaseñor:  I called 's to get the insurance information to begin a PA for the xenazine.  The patient does not have prescription coverage, and they cannot locate any insurance coverage thru Medicare.  Therefore, the patient would have to pay out-of-pocket for this prescription.  The prescription cost is $4,000.  Piper Galarza, [facility] Specialist.

## 2018-11-19 ENCOUNTER — TELEPHONE (OUTPATIENT)
Dept: PEDIATRICS | Facility: OTHER | Age: 53
End: 2018-11-19

## 2018-11-19 NOTE — TELEPHONE ENCOUNTER
Pt's wife called, states that pt is having increased R knee pain for approx the last 6 weeks. Pt is able to walk but states that it is painful to bear weight on the knee. Pt denies any trauma or injury to the area. Pt denies any swelling, redness, or bruising to the area. States that the pain ranges from 2/10 up to 7/10. Wife reports that pt recently put a new cushion on his chair and so he has had to cross his legs differently. Wife states that she thinks this is what is causing the increased pain. Pt wants an appt in the next few days. Please advise. Thank you!

## 2018-11-19 NOTE — TELEPHONE ENCOUNTER
Please schedule patient for date/time: Ophelia james we have no openings this week. He will need to see another provider or go to ER/UC    Have patient go to ER/Urgent Care Center. Urgent Care hours are 9:30 am to 8 pm, open 7 days a week. Yes.    Provider will call patient.No.    Other:

## 2018-11-20 NOTE — PROGRESS NOTES
SUBJECTIVE:   Vinny Hsu is a 53 year old male who presents to clinic today for the following health issues:      Musculoskeletal problem/pain      Duration: 3 days    Description  Location: Fell in parking lot at his apartment building on ice.    Intensity:  mild    Accompanying signs and symptoms: none    History  Previous similar problem: no   Previous evaluation:  x-ray    Precipitating or alleviating factors:  Trauma or overuse: YES  Aggravating factors include: walking    Therapies tried and outcome: rest/inactivity and heat    He has a history of plantar fascitis.  He has 2/10 pain at rest.  Turning with walking is difficult and increases his pain so he needs to shuffle.    Salvador has a old knee injury and reports that his primary problems is his right foot.    Wt Readings from Last 5 Encounters:   11/28/18 152 lb 3.2 oz (69 kg)   04/05/18 175 lb (79.4 kg)   07/26/17 175 lb (79.4 kg)   07/05/17 175 lb (79.4 kg)   06/21/17 172 lb (78 kg)     BP Readings from Last 6 Encounters:   11/28/18 130/86   11/26/18 148/100   04/05/18 102/74   07/26/17 115/65   07/13/17 110/67   07/05/17 132/62     Urinary difficulty:  He reports difficulty starting and mid stream stoppage with sensation that he has incomplete emptying.  He denies blood in his urine and he denies pain with urinating.    Problem list and histories reviewed & adjusted, as indicated.  Additional history: as documented    Patient Active Problem List   Diagnosis     MARY (generalized anxiety disorder)     Acquired hypothyroidism     Tourettes syndrome     Past Surgical History:   Procedure Laterality Date     DENTAL SURGERY Bilateral     ALL UPPER TEETH GONE     LAPAROSCOPIC HERNIORRHAPHY INGUINAL Bilateral 7/13/2017    Procedure: LAPAROSCOPIC HERNIORRHAPHY INGUINAL;  LAPAROSCOPIC REPAIR BILATERAL INGUINAL HERNIA'S;  Surgeon: Bridget Hemphill MD;  Location: [facility]       Social History   Substance Use Topics     Smoking status: Current Every Day Smoker      Types: Cigars     Smokeless tobacco: Never Used     Alcohol use Yes     Family History   Problem Relation Age of Onset     Other Cancer Mother      Coronary Artery Disease Paternal Grandfather            Reviewed and updated as needed this visit by clinical staff       Reviewed and updated as needed this visit by Provider         ROS:  Constitutional, HEENT, cardiovascular, pulmonary, gi and gu systems are negative, except as otherwise noted.    OBJECTIVE:     /86 (BP Location: Right arm, Patient Position: Sitting, Cuff Size: Adult Regular)  Pulse 96  Wt 152 lb 3.2 oz (69 kg)  SpO2 97%  BMI 22.48 kg/m2  Body mass index is 22.48 kg/(m^2).  GENERAL: healthy, alert and no distress  GENERAL: alert, no distress and appears older than stated age  MS: trace edema of the foot and ankle peripheral pulses DP and PT are normal and tenderness to palpation over the fibulocalcaneal and tibial calcaneal ligaments.    There is no fascia tenderness and heels.  No noted deformity.      Diagnostic Test Results:  11/26/18  7:01 PM [id number] [facility]    PACS Images   Show images for Foot XR, G/E 3 views, right   Study Result   PROCEDURE:  XR FOOT RT G/E 3 VW     HISTORY: pain; .     COMPARISON:  None.     TECHNIQUE:  3 views right foot.     FINDINGS:  No fracture or dislocation is identified. The joint spaces  are preserved. Plantar calcaneal spurring is present. No foreign body  is seen.          IMPRESSION: No acute fracture.       EKATERINA CHAMPION MD     Results for orders placed or performed in visit on 11/28/18   TSH   Result Value Ref Range    TSH 2.50 0.40 - 4.00 mU/L   T4 free   Result Value Ref Range    T4 Free 0.96 0.76 - 1.46 ng/dL   PSA, screen   Result Value Ref Range    PSA 1.82 0 - 4 ug/L         ASSESSMENT/PLAN:   (S93.411A) Sprain of calcaneofibular ligament of right ankle, initial encounter  Comment:   He has bilateral calcaneal ligament sprain of the superficial deltoid ligament and CFL  Plan:    capsaicin (ZOSTRIX) 0.025 % external cream TID and he will continue to wear the half boot.    (F95.2) Tourettes syndrome  Comment: Vinny has tourette's syndrome which is aggravated by his anxiety and mental and physical stress which becomes problematic at times due to misunderstandings in public settings.  He may need some skills counseling to help him keep calms in stressful situations and to keep his [place] and speech steady.  He is willing to work with behavioral health.  Plan:   He will continue Risperdal at low dose.  BEHAVIORAL HEALTH OUTPATIENT PROGRAM    (F41.1) MARY (generalized anxiety disorder)  Comment: Today he is having difficulty and he will need close follow up.  He has lost 25 lbs which is concerning as this was not purposeful.  Plan:   He will continue Wellbutrin 150 mg daily and Zoloft 200 mg daily.  He will continue Buspar 30 mg BID.  BEHAVIORAL HEALTH OUTPATIENT PROGRAM      (E03.9) Acquired hypothyroidism  (primary encounter diagnosis)  Comment: His thyroid hormone levels are normal.  Plan:   He will continue Levothyroxine 88 mcg.    (N40.1,  N13.8) BPH with urinary obstruction  Comment: His PSA is normal.  He has urinary hesitancy, slow stream and stoppage of stream with incomplete emptying.  Plan:  Start tamsulosin (FLOMAX) 0.4 MG capsule                    FUTURE APPOINTMENTS:       - Follow-up visit in  One weeks for anxiety and BPH    Elijah Villaseñor DO,   [facility] [place]

## 2018-11-26 ENCOUNTER — HOSPITAL ENCOUNTER (EMERGENCY)
Facility: HOSPITAL | Age: 53
Discharge: HOME | End: 2018-11-26
Attending: PHYSICIAN ASSISTANT | Admitting: PHYSICIAN ASSISTANT
Payer: MEDICARE

## 2018-11-26 ENCOUNTER — APPOINTMENT (OUTPATIENT)
Dept: GENERAL RADIOLOGY | Facility: HOSPITAL | Age: 53
End: 2018-11-26
Attending: PHYSICIAN ASSISTANT
Payer: MEDICARE

## 2018-11-26 VITALS
DIASTOLIC BLOOD PRESSURE: 100 MMHG | TEMPERATURE: 97 F | OXYGEN SATURATION: 96 % | SYSTOLIC BLOOD PRESSURE: 148 MMHG | RESPIRATION RATE: 18 BRPM

## 2018-11-26 DIAGNOSIS — S93.602A FOOT SPRAIN, LEFT, INITIAL ENCOUNTER: ICD-10-CM

## 2018-11-26 PROCEDURE — 99213 OFFICE O/P EST LOW 20 MIN: CPT | Performed by: PHYSICIAN ASSISTANT

## 2018-11-26 PROCEDURE — 73630 X-RAY EXAM OF FOOT: CPT | Mod: TC,RT

## 2018-11-26 PROCEDURE — G0463 HOSPITAL OUTPT CLINIC VISIT: HCPCS

## 2018-11-26 ASSESSMENT — ENCOUNTER SYMPTOMS
ARTHRALGIAS: 1
PSYCHIATRIC NEGATIVE: 1
CARDIOVASCULAR NEGATIVE: 1
RESPIRATORY NEGATIVE: 1
JOINT SWELLING: 1
NEUROLOGICAL NEGATIVE: 1
CONSTITUTIONAL NEGATIVE: 1

## 2018-11-26 NOTE — ED AVS SNAPSHOT
[facility]    [address] [address]    Phone:  [phone]                                       Vinny Hsu   MRN: [id number]    Department:  [facility]   Date of Visit:  11/26/2018           Patient Information     Date Of Birth          1965        Your diagnoses for this visit were:     Foot sprain, left, initial encounter        You were seen by Baron Becerra PA.      Follow-up Information     Follow up with Elijah Villaseñor DO.    Specialties:  Internal Medicine, Pediatrics    Why:  7-10 days    Contact information:    [address]  [facility] [address] [phone]          Follow up with [facility].    Specialty:  EMERGENCY MEDICINE    Why:  If symptoms worsen    Contact information:    [address] [address] [phone]    Additional information:    From [facility]: Take [address]. Turn left at [address]. Turn left at the first stoplight on [address]. At the first stop sign, take a right onto [address]. Take a left into the parking lot and continue through until you reach the North enterance of the building.       From [place]: Take [address]. Take the [address] ramp towards [place]. Turn left onto [address]. Take a slight right onto [address]. Turn left at the first stoplight on [address]. At the first stop sign, take a right onto [address]. Take a left into the parking lot and continue through until you reach the North enterance of the building.       From [place]: Take [address]. Take a right at [address]. At the first stop sign, take a right onto [address]. Take a left into the parking lot and continue through until you reach the North enterance of the building.         Discharge Instructions       - Rest, ice (or heat as we discussed), compression, elevation  - You may rotate ibuprofen  and tylenol every 3-6 hrs for 2-3 days  - FYI Topicals: Arnica Cream (5$ at [employer]) and/or Capsaicin. (1/4 teaspoon cayenne pepper in a palmful olive oil and rub in 2-3 times daily for 5-7 days for pain - this can get HOT, so test in small area of skin)  * In 3 days re-assess your pain: if there is any along the pinky side, continue with the supportive shoe. If there is ongoing pain in the mid-foot, continue wearing the shoe and re-xray in 7-10 days to rule out occult/hidden fracture.   * Back here sooner with pain or swelling out of proportion (toes that look like sausages!).      Discharge References/Attachments     FOOT SPRAIN (ENGLISH)      Your next 10 appointments already scheduled     Nov 28, 2018  2:00 PM CST   (Arrive by 1:40 PM)   SHORT with Elijah Villaseñor,    [facility] ([facility] )    [address] [place] [address]   [phone]                 Review of your medicines      Our records show that you are taking the medicines listed below. If these are incorrect, please call your family doctor or clinic.        Dose / Directions Last dose taken    atorvastatin 20 MG tablet   Commonly known as:  LIPITOR   Dose:  20 mg   Quantity:  90 tablet        Take 1 tablet (20 mg) by mouth daily   Refills:  3        buPROPion 150 MG 24 hr tablet   Commonly known as:  WELLBUTRIN XL   Dose:  150 mg   Indication:  Major Depressive Disorder   Quantity:  90 tablet        Take 1 tablet (150 mg) by mouth daily   Refills:  5        busPIRone HCl 30 MG tablet   Commonly known as:  BUSPAR   Dose:  30 mg   Indication:  Anxiety Disorder   Quantity:  180 tablet        Take 30 mg by mouth 2 times daily   Refills:  3        gabapentin 300 MG capsule   Commonly known as:  NEURONTIN   Indication:  Social Anxiety Disorder   Quantity:  270 capsule        ONE CAP Q AM, TWO CAPS QHS   Refills:  3        HYDROcodone-acetaminophen 5-325 MG tablet   Commonly known as:  NORCO  "  Dose:  1-2 tablet   Quantity:  30 tablet        Take 1-2 tablets by mouth every 4 hours as needed for moderate to severe pain or pain maximum 10 tablet(s) per day   Refills:  0        levothyroxine 88 MCG capsule   Commonly known as:  TIROSINT   Dose:  88 mcg   Indication:  Underactive Thyroid   Quantity:  90 capsule        Take 88 mcg by mouth daily   Refills:  3        risperiDONE 0.25 MG tablet   Commonly known as:  risperDAL   Dose:  0.25 mg   Quantity:  30 tablet        Take 1 tablet (0.25 mg) by mouth daily   Refills:  1        sertraline 100 MG tablet   Commonly known as:  ZOLOFT   Dose:  200 mg   Indication:  Anxiety Disorder, Major Depressive Disorder   Quantity:  180 tablet        Take 2 tablets (200 mg) by mouth daily   Refills:  3        traZODone 50 MG tablet   Commonly known as:  DESYREL   Dose:  50 mg   Indication:  Anxiety Disorder   Quantity:  100 tablet        Take 1 tablet (50 mg) by mouth At Bedtime MAY TAKE ADDITIONAL DOSE AS NEEDED   Refills:  3                Procedures and tests performed during your visit     Foot  XR, G/E 3 views, right      Orders Needing Specimen Collection     None      Pending Results     No orders found from 11/24/2018 to 11/27/2018.            Pending Culture Results     No orders found from 11/24/2018 to 11/27/2018.            Thank you for choosing [facility]       Thank you for choosing [facility] for your care. Our goal is always to provide you with excellent care. Hearing back from our patients is one way we can continue to improve our services. Please take a few minutes to complete the written survey that you may receive in the mail after you visit with us. Thank you!        [employer] Information     [employer] lets you send messages to your doctor, view your test results, renew your prescriptions, schedule appointments and more. To sign up, go to [email or web address] . Click on \"Log in\" on the left side of the screen, which will take you to the Welcome page. Then " "click on \"Sign up Now\" on the right side of the page.     You will be asked to enter the access code listed below, as well as some personal information. Please follow the directions to create your username and password.     Your access code is: [email or web address]  Expires: 2019 10:30 AM     Your access code will  in 90 days. If you need help or a new code, please call your [place] clinic or [phone].        Care EveryWhere ID     This is your Care EveryWhere ID. This could be used by other organizations to access your [place] medical records  [id number]        Equal Access to Services     [facility] : Tanesha Pugh, nilsa fried, belinda luu, sami urbina. So [facility] [phone].    ATENCIÓN: Si habla español, tiene a salcedo disposición servicios gratuitos de asistencia lingüística. Llame al [phone].    We comply with applicable federal civil rights laws and [place] laws. We do not discriminate on the basis of race, color, national origin, age, disability, sex, sexual orientation, or gender identity.            After Visit Summary       This is your record. Keep this with you and show to your community pharmacist(s) and doctor(s) at your next visit.                  "

## 2018-11-26 NOTE — ED AVS SNAPSHOT
[facility]    [address]    [place] [address]    Phone:  [phone]                                       Vinny Hsu   MRN: [id number]    Department:  [facility]   Date of Visit:  11/26/2018           After Visit Summary Signature Page     I have received my discharge instructions, and my questions have been answered. I have discussed any challenges I see with this plan with the nurse or doctor.    ..........................................................................................................................................  Patient/Patient Representative Signature      ..........................................................................................................................................  Patient Representative Print Name and Relationship to Patient    ..................................................               ................................................  Date                                   Time    ..........................................................................................................................................  Reviewed by Signature/Title    ...................................................              ..............................................  Date                                               Time          22EPIC Rev 08/18

## 2018-11-27 NOTE — ED PROVIDER NOTES
"  History     Chief Complaint   Patient presents with     Foot Pain     Right foot pain after slipping on ice today. Fell onto buttocks and did not hit head. 3/10 pain now.      Knee Pain     HPI  Vinny Hsu is a 53 year old male who presents to the urgent care after slipping on the ice in the parking lot this afternoon. Vinny states his right foot slid with ankle inverted in front of him and he \"crumbled\" to the ground with bent left knee to prevent a harder fall. Initially his knee was sore, but this has improved. He feels pain most intensely in the right midfoot. There is some minor swelling. He can weight bear, but the foot hurts to do so. He has been elevating since the injury with some relief.     Problem List:    Patient Active Problem List    Diagnosis Date Noted     Tourettes syndrome 04/05/2018     Priority: Medium     Acquired hypothyroidism 07/05/2017     Priority: Medium     MARY (generalized anxiety disorder) 06/21/2017     Priority: Medium        Past Medical History:    Past Medical History:   Diagnosis Date     Anxiety      BPH (benign prostatic hyperplasia)      COPD (chronic obstructive pulmonary disease) (H)      Delayed posttraumatic stress disorder following  combat      Depressive disorder      Hyperlipemia      Thyroid disease      Tourettes syndrome        Past Surgical History:    Past Surgical History:   Procedure Laterality Date     DENTAL SURGERY Bilateral     ALL UPPER TEETH GONE     LAPAROSCOPIC HERNIORRHAPHY INGUINAL Bilateral 7/13/2017    Procedure: LAPAROSCOPIC HERNIORRHAPHY INGUINAL;  LAPAROSCOPIC REPAIR BILATERAL INGUINAL HERNIA'S;  Surgeon: Bridget Hemphill MD;  Location: [facility]       Family History:    Family History   Problem Relation Age of Onset     Other Cancer Mother      Coronary Artery Disease Paternal Grandfather        Social History:  Marital Status:   [2]  Social History   Substance Use Topics     Smoking status: Current Every Day Smoker     " Types: Cigars     Smokeless tobacco: Never Used     Alcohol use Yes        Medications:      atorvastatin (LIPITOR) 20 MG tablet   buPROPion (WELLBUTRIN XL) 150 MG 24 hr tablet   BusPIRone HCl 30 MG TABS   gabapentin (NEURONTIN) 300 MG capsule   HYDROcodone-acetaminophen (NORCO) 5-325 MG per tablet   Levothyroxine Sodium 88 MCG CAPS   risperiDONE (RISPERDAL) 0.25 MG tablet   sertraline (ZOLOFT) 100 MG tablet   traZODone (DESYREL) 50 MG tablet         Review of Systems   Constitutional: Negative.    Respiratory: Negative.    Cardiovascular: Negative.    Musculoskeletal: Positive for arthralgias and joint swelling.   Skin: Negative.    Neurological: Negative.    Psychiatric/Behavioral: Negative.        Physical Exam   BP: 148/100  Heart Rate: 95  Temp: 97  F (36.1  C)  Resp: 18  SpO2: 96 %      Physical Exam   Constitutional: He is oriented to person, place, and time. He appears well-developed and well-nourished. No distress.   Cardiovascular: Normal rate.    Pulmonary/Chest: Effort normal.   Musculoskeletal:        Right knee: He exhibits normal range of motion, no swelling, no effusion, no deformity, normal alignment, no LCL laxity, normal patellar mobility and no MCL laxity. Tenderness found. LCL tenderness noted. No lateral joint line tenderness noted.        Right ankle: Normal.        Legs:       Right foot: There is tenderness and swelling. There is normal range of motion.        Feet:    Neurological: He is alert and oriented to person, place, and time.   Skin: Skin is warm and dry.   Psychiatric: He has a normal mood and affect.   Nursing note and vitals reviewed.      ED Course     ED Course     Procedures        Results for orders placed or performed during the hospital encounter of 11/26/18 (from the past 24 hour(s))   Foot  XR, G/E 3 views, right    Narrative    PROCEDURE:  XR FOOT RT G/E 3 VW    HISTORY: pain; .    COMPARISON:  None.    TECHNIQUE:  3 views right foot.    FINDINGS:  No fracture or  dislocation is identified. The joint spaces  are preserved. Plantar calcaneal spurring is present. No foreign body  is seen.       Impression    IMPRESSION: No acute fracture.      EKATERINA CHAMPION MD       Medications - No data to display    Assessments & Plan (with Medical Decision Making)     I have reviewed the nursing notes.  I have reviewed the findings, diagnosis, plan and need for follow up with the patient.    Discharge Medication List as of 11/26/2018  7:46 PM          Final diagnoses:   Foot sprain, left, initial encounter    Knee exam, other than tenderness along LCL, was normal and ankle was also normal. There was no foot Fx on XR  I did place Vinny in a post op shoe for splinting/comfort. We discussed sprain and typical resolution. RICE and tylenol/ibu as needed for pain. I did recommend staying in the shoe and re-xray in 7-10 days if any ongoing pain or ROM concerns after 72 hrs. He is agreeable to plan and discharged home stable.     11/26/2018   [facility]     Baron Becerra PA  11/26/18 [id number]

## 2018-11-27 NOTE — ED NOTES
Patient presents with pain to foot and ankle Rt side.  Patient states is on the Rt side and slipped on the ice.

## 2018-11-27 NOTE — DISCHARGE INSTRUCTIONS
- Rest, ice (or heat as we discussed), compression, elevation  - You may rotate ibuprofen and tylenol every 3-6 hrs for 2-3 days  - FYI Topicals: Arnica Cream (5$ at [employer]) and/or Capsaicin. (1/4 teaspoon cayenne pepper in a palmful olive oil and rub in 2-3 times daily for 5-7 days for pain - this can get HOT, so test in small area of skin)  * In 3 days re-assess your pain: if there is any along the pinky side, continue with the supportive shoe. If there is ongoing pain in the mid-foot, continue wearing the shoe and re-xray in 7-10 days to rule out occult/hidden fracture.   * Back here sooner with pain or swelling out of proportion (toes that look like sausages!).

## 2018-11-28 ENCOUNTER — OFFICE VISIT (OUTPATIENT)
Dept: PEDIATRICS | Facility: OTHER | Age: 53
End: 2018-11-28
Attending: INTERNAL MEDICINE
Payer: MEDICARE

## 2018-11-28 VITALS
WEIGHT: 152.2 LBS | HEART RATE: 96 BPM | BODY MASS INDEX: 22.48 KG/M2 | DIASTOLIC BLOOD PRESSURE: 86 MMHG | OXYGEN SATURATION: 97 % | SYSTOLIC BLOOD PRESSURE: 130 MMHG

## 2018-11-28 DIAGNOSIS — S93.411A SPRAIN OF CALCANEOFIBULAR LIGAMENT OF RIGHT ANKLE, INITIAL ENCOUNTER: ICD-10-CM

## 2018-11-28 DIAGNOSIS — E03.9 ACQUIRED HYPOTHYROIDISM: Primary | ICD-10-CM

## 2018-11-28 DIAGNOSIS — Z12.5 SCREENING FOR PROSTATE CANCER: ICD-10-CM

## 2018-11-28 DIAGNOSIS — E03.9 ACQUIRED HYPOTHYROIDISM: ICD-10-CM

## 2018-11-28 DIAGNOSIS — N40.1 BPH WITH URINARY OBSTRUCTION: ICD-10-CM

## 2018-11-28 DIAGNOSIS — F41.1 GAD (GENERALIZED ANXIETY DISORDER): ICD-10-CM

## 2018-11-28 DIAGNOSIS — N13.8 BPH WITH URINARY OBSTRUCTION: ICD-10-CM

## 2018-11-28 DIAGNOSIS — F95.2 TOURETTES SYNDROME: ICD-10-CM

## 2018-11-28 LAB
PSA SERPL-ACNC: 1.82 UG/L (ref 0–4)
T4 FREE SERPL-MCNC: 0.96 NG/DL (ref 0.76–1.46)
TSH SERPL DL<=0.005 MIU/L-ACNC: 2.5 MU/L (ref 0.4–4)

## 2018-11-28 PROCEDURE — 84439 ASSAY OF FREE THYROXINE: CPT | Mod: ZL | Performed by: INTERNAL MEDICINE

## 2018-11-28 PROCEDURE — 84443 ASSAY THYROID STIM HORMONE: CPT | Mod: ZL | Performed by: INTERNAL MEDICINE

## 2018-11-28 PROCEDURE — 84153 ASSAY OF PSA TOTAL: CPT | Mod: ZL | Performed by: INTERNAL MEDICINE

## 2018-11-28 PROCEDURE — G0103 PSA SCREENING: HCPCS | Mod: ZL | Performed by: INTERNAL MEDICINE

## 2018-11-28 PROCEDURE — 36415 COLL VENOUS BLD VENIPUNCTURE: CPT | Mod: ZL | Performed by: INTERNAL MEDICINE

## 2018-11-28 PROCEDURE — G0463 HOSPITAL OUTPT CLINIC VISIT: HCPCS

## 2018-11-28 PROCEDURE — 99214 OFFICE O/P EST MOD 30 MIN: CPT | Performed by: INTERNAL MEDICINE

## 2018-11-28 RX ORDER — TAMSULOSIN HYDROCHLORIDE 0.4 MG/1
0.4 CAPSULE ORAL DAILY
Qty: 30 CAPSULE | Refills: 1 | Status: SHIPPED | OUTPATIENT
Start: 2018-11-28 | End: 2019-03-29

## 2018-11-28 RX ORDER — CAPSAICIN 0.025 %
1 CREAM (GRAM) TOPICAL 3 TIMES DAILY PRN
Qty: 120 G | Refills: 1 | Status: SHIPPED | OUTPATIENT
Start: 2018-11-28

## 2018-11-28 ASSESSMENT — PAIN SCALES - GENERAL: PAINLEVEL: MILD PAIN (2)

## 2018-11-28 ASSESSMENT — PATIENT HEALTH QUESTIONNAIRE - PHQ9: SUM OF ALL RESPONSES TO PHQ QUESTIONS 1-9: 14

## 2018-11-28 NOTE — MR AVS SNAPSHOT
After Visit Summary   2018    Vinny Hsu    MRN: [id number]           Patient Information     Date Of Birth          1965        Visit Information        Provider Department      2018 2:00 PM Eli[facility]        Today's Diagnoses     Acquired hypothyroidism    -  1    Sprain of calcaneofibular ligament of right ankle, initial encounter        Tourettes syndrome        MARY (generalized anxiety disorder)        BPH with urinary obstruction           Follow-ups after your visit        Additional Services     BEHAVIORAL HEALTH OUTPATIENT PROGRAM       Your provider has referred you to the Adult Mental Health Outpatient Program.    Order Diagnostic Assessment: Assess and Treat. Program placement will be determined by the     Provider recommendation to consider placement for (select one or more programs):     Is it clinically necessary for the Diagnostic Assessment to be completed prior to the patient's discharge from the hospital: No      If you have questions about this referral,  please contact Behavioral Access at: [phone]    Programs are all located at:  [facility] [place] [address], [address] [place] [id number]    Please be aware that coverage of these services is subject to the terms and limitations of your health insurance plan.  Call member services at your health plan with any benefit or coverage questions.      Please bring the following with you to your appointment:      (1) List of current medications   (2) This referral request   (3) Any documents/labs given to you for this referral                  Your next 10 appointments already scheduled     Dec 05, 2018 11:40 AM CST   (Arrive by 11:20 AM)   [facility]    [address]  "[id number]   [phone]            Dec 10, 2018  2:00 PM CST   (Arrive by 1:45 PM)   [facility]    [address] [place] [address]  [place] [id number] [phone]              Who to contact     If you have questions or need follow up information about today's clinic visit or your schedule please contact [facility] directly at [phone].  Normal or non-critical lab and imaging results will be communicated to you by AccelOnehart, letter or phone within 4 business days after the clinic has received the results. If you do not hear from us within 7 days, please contact the clinic through AccelOnehart or phone. If you have a critical or abnormal lab result, we will notify you by phone as soon as possible.  Submit refill requests through ZupCat or call your pharmacy and they will forward the refill request to us. Please allow 3 business days for your refill to be completed.          Additional Information About Your Visit        MyCharWealthyLife Information     ZupCat lets you send messages to your doctor, view your test results, renew your prescriptions, schedule appointments and more. To sign up, go to [email or web address] . Click on \"Log in\" on the left side of the screen, which will take you to the Welcome page. Then click on \"Sign up Now\" on the right side of the page.     You will be asked to enter the access code listed below, as well as some personal information. Please follow the directions to create your username and password.     Your access code is: [email or web address]  Expires: 2019  1:24 PM     Your access code will  in 90 days. If you need help or a new code, please call your [facility] clinic or [phone].        Care EveryWhere ID     This is your Care EveryWhere ID. This could be used by other organizations to access your [facility] medical records  [id number]        Your Vitals Were     Pulse " Pulse Oximetry BMI (Body Mass Index)             96 97% 22.48 kg/m2          Blood Pressure from Last 3 Encounters:   11/28/18 130/86   11/26/18 148/100   04/05/18 102/74    Weight from Last 3 Encounters:   11/28/18 152 lb 3.2 oz (69 kg)   04/05/18 175 lb (79.4 kg)   07/26/17 175 lb (79.4 kg)              We Performed the Following     BEHAVIORAL HEALTH OUTPATIENT PROGRAM          Today's Medication Changes          These changes are accurate as of 11/28/18  3:01 PM.  If you have any questions, ask your nurse or doctor.               Start taking these medicines.        Dose/Directions    capsaicin 0.025 % external cream   Commonly known as:  ZOSTRIX   Used for:  Sprain of calcaneofibular ligament of right ankle, initial encounter   Started by:  Elijah Villaseñor DO        Dose:  1 g   Apply 1 g topically 3 times daily as needed (ankle pain)   Quantity:  120 g   Refills:  1       tamsulosin 0.4 MG capsule   Commonly known as:  FLOMAX   Used for:  BPH with urinary obstruction   Started by:  Elijah Villaseñor DO        Dose:  0.4 mg   Take 1 capsule (0.4 mg) by mouth daily   Quantity:  30 capsule   Refills:  1            Where to get your medicines      These medications were sent to [facility] - [address] [address]     Phone:  [phone]     capsaicin 0.025 % external cream    tamsulosin 0.4 MG capsule                Primary Care Provider Office Phone # Fax #    Elijah Villaseñor [phone]       [address] [place] [address]        Equal Access to Services     [facility] Center[facility]AY[facility] AH: Hadii sarina reese hadasho Soomaali, waaxda luqadaha, qaybta kaalmada adeegyada, sami urbina. So [facility] [phone].    ATENCIÓN: Si habla español, tiene a salcedo disposición servicios gratuitos de asistencia lingüística. Gurinder valencia [phone].    We comply with applicable federal civil rights laws and [place] laws.  We do not discriminate on the basis of race, color, national origin, age, disability, sex, sexual orientation, or gender identity.            Thank you!     Thank you for choosing [facility]  for your care. Our goal is always to provide you with excellent care. Hearing back from our patients is one way we can continue to improve our services. Please take a few minutes to complete the written survey that you may receive in the mail after your visit with us. Thank you!             Your Updated Medication List - Protect others around you: Learn how to safely use, store and throw away your medicines at www.disposemymeds.org.          This list is accurate as of 11/28/18  3:01 PM.  Always use your most recent med list.                   Brand Name Dispense Instructions for use Diagnosis    atorvastatin 20 MG tablet    LIPITOR    90 tablet    Take 1 tablet (20 mg) by mouth daily    Mixed hyperlipidemia       buPROPion 150 MG 24 hr tablet    WELLBUTRIN XL    90 tablet    Take 1 tablet (150 mg) by mouth daily    Major depressive disorder, recurrent episode, moderate (H), Mood disorder (H)       busPIRone HCl 30 MG tablet    BUSPAR    180 tablet    Take 30 mg by mouth 2 times daily    MARY (generalized anxiety disorder)       capsaicin 0.025 % external cream    ZOSTRIX    120 g    Apply 1 g topically 3 times daily as needed (ankle pain)    Sprain of calcaneofibular ligament of right ankle, initial encounter       gabapentin 300 MG capsule    NEURONTIN    270 capsule    ONE CAP Q AM, TWO CAPS QHS    Mood disorder (H)       HYDROcodone-acetaminophen 5-325 MG tablet    NORCO    30 tablet    Take 1-2 tablets by mouth every 4 hours as needed for moderate to severe pain or pain maximum 10 tablet(s) per day    Status post inguinal hernia repair using synthetic patch       levothyroxine 88 MCG capsule    TIROSINT    90 capsule    Take 88 mcg by mouth daily    Acquired hypothyroidism       risperiDONE 0.25 MG  tablet    risperDAL    30 tablet    Take 1 tablet (0.25 mg) by mouth daily    Tourette syndrome       sertraline 100 MG tablet    ZOLOFT    180 tablet    Take 2 tablets (200 mg) by mouth daily    Major depressive disorder, recurrent episode, moderate (H)       tamsulosin 0.4 MG capsule    FLOMAX    30 capsule    Take 1 capsule (0.4 mg) by mouth daily    BPH with urinary obstruction       traZODone 50 MG tablet    DESYREL    100 tablet    Take 1 tablet (50 mg) by mouth At Bedtime MAY TAKE ADDITIONAL DOSE AS NEEDED    Persistent insomnia

## 2018-11-28 NOTE — NURSING NOTE
"Chief Complaint   Patient presents with     Knee Pain     [facility] F/U     Urgent Care 11/26/18       Initial /86 (BP Location: Right arm, Patient Position: Sitting, Cuff Size: Adult Regular)  Pulse 96  Wt 152 lb 3.2 oz (69 kg)  SpO2 97%  BMI 22.48 kg/m2 Estimated body mass index is 22.48 kg/(m^2) as calculated from the following:    Height as of 4/5/18: 5' 9\" (1.753 m).    Weight as of this encounter: 152 lb 3.2 oz (69 kg).  Medication Reconciliation: complete    Yolanda Gil MA  "

## 2018-12-10 ENCOUNTER — OFFICE VISIT (OUTPATIENT)
Dept: BEHAVIORAL HEALTH | Facility: OTHER | Age: 53
End: 2018-12-10
Attending: SOCIAL WORKER
Payer: MEDICARE

## 2018-12-10 DIAGNOSIS — F41.1 GAD (GENERALIZED ANXIETY DISORDER): ICD-10-CM

## 2018-12-10 DIAGNOSIS — F34.1 PERSISTENT DEPRESSIVE DISORDER: ICD-10-CM

## 2018-12-10 DIAGNOSIS — F43.10 PTSD (POST-TRAUMATIC STRESS DISORDER): ICD-10-CM

## 2018-12-10 DIAGNOSIS — F95.2 TOURETTES SYNDROME: Primary | ICD-10-CM

## 2018-12-10 DIAGNOSIS — F10.20 UNCOMPLICATED ALCOHOL DEPENDENCE (H): ICD-10-CM

## 2018-12-10 PROCEDURE — 90791 PSYCH DIAGNOSTIC EVALUATION: CPT | Performed by: SOCIAL WORKER
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
## 2018-12-12 PROBLEM — F10.20 UNCOMPLICATED ALCOHOL DEPENDENCE (H): Status: ACTIVE | Noted: 2018-12-12

## 2018-12-12 PROBLEM — F43.10 PTSD (POST-TRAUMATIC STRESS DISORDER): Status: ACTIVE | Noted: 2018-12-12

## 2018-12-12 PROBLEM — F34.1 PERSISTENT DEPRESSIVE DISORDER: Status: ACTIVE | Noted: 2018-12-12

## 2019-02-22 DIAGNOSIS — F33.1 MAJOR DEPRESSIVE DISORDER, RECURRENT EPISODE, MODERATE (H): ICD-10-CM

## 2019-02-22 NOTE — TELEPHONE ENCOUNTER
zoloft      Last Written Prescription Date:  6/15/17  Last Fill Quantity: 180,   # refills: 3  Last Office Visit: 11/28/18  Future Office visit:    Next 5 appointments (look out 90 days)    Feb 27, 2019  2:30 PM CST  (Arrive by 2:15 PM)  Return Visit with FLORENTIN Delgadillo  [facility] ([facility] ) [address] [place] [id number] [phone]

## 2019-02-25 RX ORDER — SERTRALINE HYDROCHLORIDE 100 MG/1
200 TABLET, FILM COATED ORAL DAILY
Qty: 180 TABLET | Refills: 3 | Status: SHIPPED | OUTPATIENT
Start: 2019-02-25 | End: 2020-02-12

## 2019-03-11 ENCOUNTER — OFFICE VISIT (OUTPATIENT)
Dept: BEHAVIORAL HEALTH | Facility: OTHER | Age: 54
End: 2019-03-11
Attending: SOCIAL WORKER
Payer: MEDICARE

## 2019-03-11 DIAGNOSIS — F43.10 PTSD (POST-TRAUMATIC STRESS DISORDER): Primary | ICD-10-CM

## 2019-03-11 DIAGNOSIS — F41.1 GAD (GENERALIZED ANXIETY DISORDER): ICD-10-CM

## 2019-03-11 PROCEDURE — 90832 PSYTX W PT 30 MINUTES: CPT | Performed by: SOCIAL WORKER

## 2019-03-11 ASSESSMENT — ANXIETY QUESTIONNAIRES
3. WORRYING TOO MUCH ABOUT DIFFERENT THINGS: NOT AT ALL
2. NOT BEING ABLE TO STOP OR CONTROL WORRYING: SEVERAL DAYS
6. BECOMING EASILY ANNOYED OR IRRITABLE: SEVERAL DAYS
1. FEELING NERVOUS, ANXIOUS, OR ON EDGE: SEVERAL DAYS
5. BEING SO RESTLESS THAT IT IS HARD TO SIT STILL: NOT AT ALL
GAD7 TOTAL SCORE: 6
IF YOU CHECKED OFF ANY PROBLEMS ON THIS QUESTIONNAIRE, HOW DIFFICULT HAVE THESE PROBLEMS MADE IT FOR YOU TO DO YOUR WORK, TAKE CARE OF THINGS AT HOME, OR GET ALONG WITH OTHER PEOPLE: VERY DIFFICULT
7. FEELING AFRAID AS IF SOMETHING AWFUL MIGHT HAPPEN: SEVERAL DAYS

## 2019-03-11 ASSESSMENT — PATIENT HEALTH QUESTIONNAIRE - PHQ9
5. POOR APPETITE OR OVEREATING: MORE THAN HALF THE DAYS
SUM OF ALL RESPONSES TO PHQ QUESTIONS 1-9: 13

## 2019-03-11 NOTE — PROGRESS NOTES
[facility]  March 11, 2019    Behavioral Health Clinician Progress Note    Patient Name: Vinny Hsu         Service Type: Individual           Service Location:  Face to Face in Clinic      Session Start Time:  3:00  Session End Time: 3:30      Session Length: 16 - 37      Attendees: Patient and Spouse / Significant Other    Visit Activities (Refresh list every visit): [facility] Only      Diagnostic Assessment Date: 12/10/18  Treatment Plan Review Date: To be completed in next 3 business days.    Previous PHQ-9:   PHQ 4/5/2018 11/28/2018 3/11/2019   PHQ-9 Total Score 18 14 13   Q9: Suicide Ideation Several days Not at all Not at all   F/U: Thoughts of suicide or self-harm No - -   F/U: Safety concerns No - -     Previous MARY-7:   MARY-7 SCORE 6/21/2017 4/5/2018 3/11/2019   Total Score 12 21 6     In the past two weeks have you had thoughts of suicide or self-harm?  No.    Do you have concerns about your personal safety or the safety of others?   No  Suicide Assessment Five-step Evaluation and Treatment (SAFE-T)  LEA LEVEL:  LEA Score (Last Two) 12/10/2018 12/11/2018   LEA Raw Score 27 27   Activation Score 47.4 47.4   LEA Level 2 2       DATA  Extended Session (60+ minutes): No  Interactive Complexity: No  Crisis: No  [facility] Patient No    Treatment Objective(s) Addressed in This Session:  Target Behavior(s):  Anxiety: will experience a reduction in anxiety, will develop more effective coping skills to manage anxiety symptoms, will develop healthy cognitive patterns and beliefs and will increase ability to function adaptively  Functional Impairment: will effectively address problems that interfere with adaptive functioning  Mood Instability: will develop better understanding of triggers and coping strategies to stabilize mood  Anger Management: will learn strategies to resolve conflict adaptively and will learn and practice positive anger management skills     Current Stressors /  Issues:  Patient requesting partner to be with during individual session today.  They are having issues with another [employer] spraying some type of air freshener in their apartment complex.  This is affecting him physically, but also increasing his mental health symptoms.  Discussed coping skills he has used in the past to address his symptoms.  Patient also reports that when he is more agitated or anxious, his tourettes symptoms increase as well.  Discussed use of alpha-stim, expressed interest and will research it more.  Would like to try it next session.    Progress on Treatment Objective(s) / Homework:  New Objective established this session - PRECONTEMPLATION (Not seeing need for change); Intervened by educating the patient about the effects of current behavior on health.  Evoked information about reasons to continue behavior, express concern / recommendations, and explored any change talk    Motivational Interviewing    MI Intervention: Expressed Empathy/Understanding, Supported Autonomy, Collaboration, Evocation, Open-ended questions, Reflections: simple and complex, Change talk (evoked) and Reframe     Change Talk Expressed by the Patient: NA - Precontemplative    Provider Response to Change Talk: E - Evoked more info from patient about behavior change, A - Affirmed patient's thoughts, decisions, or attempts at behavior change, R - Reflected patient's change talk and S - Summarized patient's change talk statements      SOLUTION FOCUSED: Explored patterns in patient's relationships and discussed options for new behaviors, Explored patterns in patient's actions and choices and discussed options for new behaviors    Care Plan review completed: No    Medication Review:  No changes to current psychiatric medication(s)    Medication Compliance:  Yes    Changes in Health Issues:   Yes: Chronic disease management, Associated Psychological Distress    Chemical Use Review:   Substance Use: Problem use continues with no  change since last session, No discussion regarding reduction/ cesation held at this appointment.        Tobacco Use: No change in amount of tobacco use since last session.  Patient declined discussion at this time    Assessment: Current Emotional / Mental Status (status of significant symptoms):    Risk status (Self / Other harm or suicidal ideation)  Patient denies current fears or concerns for personal safety.  Patient denies current or recent suicidal ideation or behaviors.  Patient denies current or recent homicidal ideation or behaviors.  Patient denies current or recent self injurious behavior or ideation.  Patient denies other safety concerns.  A safety and risk management plan has not been developed at this time, however patient was encouraged to call [place] [phone] should there be a change in any of these risk factors.    Appearance:   Appropriate   Eye Contact:   Fair   Psychomotor Behavior: Agitated  Restless   Attitude:   Cooperative   Orientation:   All  Speech   Rate / Production: Pressured    Volume:  Loud   Mood:    Angry  Anxious  Irritable   Affect:    Appropriate   Thought Content:  Clear   Thought Form:  Coherent  Logical   Insight:    Fair     Diagnoses:  1. PTSD (post-traumatic stress disorder)    2. MARY (generalized anxiety disorder)        Collateral Reports Completed:  Not Applicable    Plan: (Homework, other):  Patient was given information about behavioral services and encouraged to schedule a follow up appointment with the clinic [facility] in 1 week.  He was also given information about mental health symptoms and treatment options .  CD Recommendations: Practice Harm Reduction: Patient not willing to reduce use, nor does he feel it is problematic at this time.      Josy Ayala [facility], [facility]

## 2019-03-12 ASSESSMENT — ANXIETY QUESTIONNAIRES: GAD7 TOTAL SCORE: 6

## 2019-03-27 ENCOUNTER — OFFICE VISIT (OUTPATIENT)
Dept: BEHAVIORAL HEALTH | Facility: OTHER | Age: 54
End: 2019-03-27
Attending: SOCIAL WORKER
Payer: MEDICARE

## 2019-03-27 DIAGNOSIS — F41.1 GAD (GENERALIZED ANXIETY DISORDER): Primary | ICD-10-CM

## 2019-03-27 DIAGNOSIS — F34.1 PERSISTENT DEPRESSIVE DISORDER: ICD-10-CM

## 2019-03-27 PROCEDURE — 90832 PSYTX W PT 30 MINUTES: CPT | Performed by: SOCIAL WORKER

## 2019-03-27 ASSESSMENT — PATIENT HEALTH QUESTIONNAIRE - PHQ9
5. POOR APPETITE OR OVEREATING: SEVERAL DAYS
SUM OF ALL RESPONSES TO PHQ QUESTIONS 1-9: 7

## 2019-03-27 ASSESSMENT — ANXIETY QUESTIONNAIRES
5. BEING SO RESTLESS THAT IT IS HARD TO SIT STILL: NOT AT ALL
7. FEELING AFRAID AS IF SOMETHING AWFUL MIGHT HAPPEN: SEVERAL DAYS
IF YOU CHECKED OFF ANY PROBLEMS ON THIS QUESTIONNAIRE, HOW DIFFICULT HAVE THESE PROBLEMS MADE IT FOR YOU TO DO YOUR WORK, TAKE CARE OF THINGS AT HOME, OR GET ALONG WITH OTHER PEOPLE: SOMEWHAT DIFFICULT
6. BECOMING EASILY ANNOYED OR IRRITABLE: NOT AT ALL
3. WORRYING TOO MUCH ABOUT DIFFERENT THINGS: SEVERAL DAYS
1. FEELING NERVOUS, ANXIOUS, OR ON EDGE: SEVERAL DAYS

## 2019-03-27 NOTE — PROGRESS NOTES
[facility]  March 27, 2019    Behavioral Health Clinician Progress Note    Patient Name: Vinny Hsu         Service Type: Individual           Service Location:  Face to Face in Clinic      Session Start Time:  2:45  Session End Time: 3:15      Session Length: 16 - 37      Attendees: Patient and Spouse / Significant Other    Visit Activities (Refresh list every visit): [facility] Only      Diagnostic Assessment Date: 12/10/18  Treatment Plan Review Date: To be completed in next 2 visits.    Previous PHQ-9:   PHQ 11/28/2018 3/11/2019 3/27/2019   PHQ-9 Total Score 14 13 7   Q9: Suicide Ideation Not at all Not at all Not at all   F/U: Thoughts of suicide or self-harm - - -   F/U: Safety concerns - - -     Previous MARY-7:   MARY-7 SCORE 6/21/2017 4/5/2018 3/11/2019   Total Score 12 21 6     In the past two weeks have you had thoughts of suicide or self-harm?  No.    Do you have concerns about your personal safety or the safety of others?   No  Suicide Assessment Five-step Evaluation and Treatment (SAFE-T)  LEA LEVEL:  LEA Score (Last Two) 12/10/2018 12/11/2018   LEA Raw Score 27 27   Activation Score 47.4 47.4   LEA Level 2 2       DATA  Extended Session (60+ minutes): No  Interactive Complexity: No  Crisis: No  [facility] Patient No    Treatment Objective(s) Addressed in This Session:  Target Behavior(s):  Anxiety: will experience a reduction in anxiety, will develop more effective coping skills to manage anxiety symptoms, will develop healthy cognitive patterns and beliefs and will increase ability to function adaptively  Functional Impairment: will effectively address problems that interfere with adaptive functioning  Mood Instability: will develop better understanding of triggers and coping strategies to stabilize mood  Anger Management: will learn strategies to resolve conflict adaptively and will learn and practice positive anger management skills     Current Stressors / Issues:  Patient  requesting partner to be with during individual session again today.  Continuing to have issues with other apartment [place].  Has recognized how this is affecting his mental and physical health.  Taking control of the situation, realigning his emotions and states he's not letting it affect him as much.  Vinny  reported that he would like to use alpha-stim Cranial Electrotherapy Stimulation (MAURICIO) during therapy session today.  he gave verbal consent to use Alpha- Stim  as a tx modality.  Discussed any false beliefs, myths, concerns about the use.  Facilitated a brief introduction with patient.  Reviewed potential for dizzy or nausea feeling.   he expressed understanding and in agreement with continued use.  Vinny  did not have this response, but was able to tolerate treatment with no complications. he completed a 20 minute treatment session at 300 microampere (?A) current.  he reports unknown effect of it, however desires to continue using it.    Progress on Treatment Objective(s) / Homework:  New Objective established this session - PRECONTEMPLATION (Not seeing need for change); Intervened by educating the patient about the effects of current behavior on health.  Evoked information about reasons to continue behavior, express concern / recommendations, and explored any change talk    Motivational Interviewing    MI Intervention: Expressed Empathy/Understanding, Supported Autonomy, Collaboration, Evocation, Open-ended questions, Reflections: simple and complex, Change talk (evoked) and Reframe     Change Talk Expressed by the Patient: NA - Precontemplative    Provider Response to Change Talk: E - Evoked more info from patient about behavior change, A - Affirmed patient's thoughts, decisions, or attempts at behavior change, R - Reflected patient's change talk and S - Summarized patient's change talk statements      SOLUTION FOCUSED: Explored patterns in patient's relationships and discussed options for new  behaviors, Explored patterns in patient's actions and choices and discussed options for new behaviors    Care Plan review completed: No    Medication Review:  No changes to current psychiatric medication(s)    Medication Compliance:  Yes    Changes in Health Issues:   Yes: Chronic disease management, Associated Psychological Distress    Chemical Use Review:   Substance Use: Problem use continues with no change since last session, No discussion regarding reduction/ cesation held at this appointment.        Tobacco Use: No change in amount of tobacco use since last session.  Patient declined discussion at this time    Assessment: Current Emotional / Mental Status (status of significant symptoms):    Risk status (Self / Other harm or suicidal ideation)  Patient denies current fears or concerns for personal safety.  Patient denies current or recent suicidal ideation or behaviors.  Patient denies current or recent homicidal ideation or behaviors.  Patient denies current or recent self injurious behavior or ideation.  Patient denies other safety concerns.  A safety and risk management plan has not been developed at this time, however patient was encouraged to call [facility] should there be a change in any of these risk factors.    Appearance:   Appropriate   Eye Contact:   Fair   Psychomotor Behavior: Agitated  Restless   Attitude:   Cooperative   Orientation:   All  Speech   Rate / Production: Pressured    Volume:  Loud   Mood:    Anxious  Normal  Affect:    Appropriate   Thought Content:  Clear   Thought Form:  Coherent  Logical   Insight:    Fair     Diagnoses:  1. MARY (generalized anxiety disorder)    2. Persistent depressive disorder        Collateral Reports Completed:  Not Applicable    Plan: (Homework, other):  Patient was given information about behavioral services and encouraged to schedule a follow up appointment with the clinic [facility] in 1 week.  He was also given information about mental health  symptoms and treatment options .  CD Recommendations: Practice Harm Reduction: Patient not willing to reduce use, nor does he feel it is problematic at this time.      Josy Ayala, [facility], [facility]

## 2019-03-29 DIAGNOSIS — N40.1 BPH WITH URINARY OBSTRUCTION: ICD-10-CM

## 2019-03-29 DIAGNOSIS — N13.8 BPH WITH URINARY OBSTRUCTION: ICD-10-CM

## 2019-03-29 RX ORDER — TAMSULOSIN HYDROCHLORIDE 0.4 MG/1
CAPSULE ORAL
Qty: 30 CAPSULE | Refills: 5 | Status: SHIPPED | OUTPATIENT
Start: 2019-03-29 | End: 2020-04-06

## 2019-03-29 NOTE — TELEPHONE ENCOUNTER
TAMSULOSIN HCL 0.4 MG CAPSULE  Last Written Prescription Date:  11/28/18  Last Fill Quantity: 30 capsules,   # refills: 1  Last Office Visit: 11/28/18  Future Office visit:    Next 5 appointments (look out 90 days)    Apr 03, 2019  3:00 PM CDT  (Arrive by 2:45 PM)  Return Visit with Josy Ayala [facility] ([facility] [address] [place] [address] [id number] [phone]           Routing refill request to provider for review/approval because:

## 2019-04-03 ENCOUNTER — OFFICE VISIT (OUTPATIENT)
Dept: BEHAVIORAL HEALTH | Facility: OTHER | Age: 54
End: 2019-04-03
Attending: SOCIAL WORKER
Payer: MEDICARE

## 2019-04-03 DIAGNOSIS — F34.1 PERSISTENT DEPRESSIVE DISORDER: ICD-10-CM

## 2019-04-03 DIAGNOSIS — F41.1 GAD (GENERALIZED ANXIETY DISORDER): Primary | ICD-10-CM

## 2019-04-03 PROCEDURE — 90832 PSYTX W PT 30 MINUTES: CPT | Performed by: SOCIAL WORKER

## 2019-04-03 ASSESSMENT — ANXIETY QUESTIONNAIRES
3. WORRYING TOO MUCH ABOUT DIFFERENT THINGS: SEVERAL DAYS
GAD7 TOTAL SCORE: 7
1. FEELING NERVOUS, ANXIOUS, OR ON EDGE: SEVERAL DAYS
6. BECOMING EASILY ANNOYED OR IRRITABLE: SEVERAL DAYS
2. NOT BEING ABLE TO STOP OR CONTROL WORRYING: SEVERAL DAYS
5. BEING SO RESTLESS THAT IT IS HARD TO SIT STILL: NOT AT ALL
IF YOU CHECKED OFF ANY PROBLEMS ON THIS QUESTIONNAIRE, HOW DIFFICULT HAVE THESE PROBLEMS MADE IT FOR YOU TO DO YOUR WORK, TAKE CARE OF THINGS AT HOME, OR GET ALONG WITH OTHER PEOPLE: SOMEWHAT DIFFICULT
7. FEELING AFRAID AS IF SOMETHING AWFUL MIGHT HAPPEN: NOT AT ALL

## 2019-04-03 ASSESSMENT — PATIENT HEALTH QUESTIONNAIRE - PHQ9: 5. POOR APPETITE OR OVEREATING: NEARLY EVERY DAY

## 2019-04-05 ASSESSMENT — PATIENT HEALTH QUESTIONNAIRE - PHQ9: SUM OF ALL RESPONSES TO PHQ QUESTIONS 1-9: 9

## 2019-04-05 NOTE — PROGRESS NOTES
[facility]  April 3, 2019    Behavioral Health Clinician Progress Note    Patient Name: Vinny Hsu         Service Type: Individual           Service Location:  Face to Face in Clinic      Session Start Time:  3:20  Session End Time: 3:50      Session Length: 16 - 37      Attendees: Patient    Visit Activities (Refresh list every visit): [facility] Only and MSW intern      Diagnostic Assessment Date: 12/10/18  Treatment Plan Review Date: To be completed in next visit.    Previous PHQ-9:   PHQ 3/11/2019 3/27/2019 4/3/2019   PHQ-9 Total Score 13 7 9   Q9: Thoughts of better off dead/self-harm past 2 weeks Not at all Not at all Not at all   F/U: Thoughts of suicide or self-harm - - -   F/U: Safety concerns - - -     Previous MARY-7:   MARY-7 SCORE 4/5/2018 3/11/2019 4/3/2019   Total Score 21 6 7     In the past two weeks have you had thoughts of suicide or self-harm?  No.    Do you have concerns about your personal safety or the safety of others?   No  Suicide Assessment Five-step Evaluation and Treatment (SAFE-T)  LEA LEVEL:  LEA Score (Last Two) 12/10/2018 12/11/2018   LEA Raw Score 27 27   Activation Score 47.4 47.4   LEA Level 2 2       DATA  Extended Session (60+ minutes): No  Interactive Complexity: No  Crisis: No  [facility] Patient No    Treatment Objective(s) Addressed in This Session:  Target Behavior(s):  Anxiety: will experience a reduction in anxiety, will develop more effective coping skills to manage anxiety symptoms, will develop healthy cognitive patterns and beliefs and will increase ability to function adaptively  Functional Impairment: will effectively address problems that interfere with adaptive functioning  Mood Instability: will develop better understanding of triggers and coping strategies to stabilize mood  Anger Management: will learn strategies to resolve conflict adaptively and will learn and practice positive anger management skills     Current Stressors /  "Issues:  Continuing to have issues with other apartment [place], is not as focused on the issue during this session.  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  He completed a 20 minute treatment session at 600 microampere (?A) current.  he reports unknown effect of it, however desires to continue using it.    Progress on Treatment Objective(s) / Homework:  New Objective established this session - PRECONTEMPLATION (Not seeing need for change); Intervened by educating the patient about the effects of current behavior on health.  Evoked information about reasons to continue behavior, express concern / recommendations, and explored any change talk    Motivational Interviewing    MI Intervention: Expressed Empathy/Understanding, Supported Autonomy, Collaboration, Evocation, Open-ended questions, Reflections: simple and complex, Change talk (evoked) and Reframe     Change Talk Expressed by the Patient: NA - Precontemplative    Provider Response to Change Talk: E - Evoked more info from patient about behavior change, A - Affirmed patient's thoughts, decisions, or attempts at behavior change, R - Reflected patient's change talk and S - Summarized patient's change talk statements      SOLUTION FOCUSED: Explored patterns in patient's relationships and discussed options for new behaviors, Explored patterns in patient's actions and choices and discussed options for new behaviors    Care Plan review completed: No    Medication Review:  No changes to current psychiatric medication(s)    Medication Compliance:  Yes    Changes in Health Issues:   Yes: Chronic disease management, Associated Psychological Distress    Chemical Use Review:   Substance Use: Problem use continues with no change since last session, No discussion regarding reduction/ cesation held at this appointment. "        Tobacco Use: No change in amount of tobacco use since last session.  Patient declined discussion at this time    Assessment: Current Emotional / Mental Status (status of significant symptoms):    Risk status (Self / Other harm or suicidal ideation)  Patient denies current fears or concerns for personal safety.  Patient denies current or recent suicidal ideation or behaviors.  Patient denies current or recent homicidal ideation or behaviors.  Patient denies current or recent self injurious behavior or ideation.  Patient denies other safety concerns.  A safety and risk management plan has not been developed at this time, however patient was encouraged to call [place] [phone] should there be a change in any of these risk factors.    Appearance:   Appropriate   Eye Contact:   Fair   Psychomotor Behavior: Agitated  Restless   Attitude:   Cooperative   Orientation:   All  Speech   Rate / Production: Pressured    Volume:  Loud   Mood:    Anxious  Normal  Affect:    Appropriate   Thought Content:  Clear   Thought Form:  Coherent  Logical   Insight:    Fair     Diagnoses:  1. MARY (generalized anxiety disorder)    2. Persistent depressive disorder        Collateral Reports Completed:  Not Applicable    Plan: (Homework, other):  Patient was given information about behavioral services and encouraged to schedule a follow up appointment with the clinic [facility] in 1 week.  He was also given information about mental health symptoms and treatment options .  CD Recommendations: Practice Harm Reduction: Patient not willing to reduce use, nor does he feel it is problematic at this time.      FLORENTIN Lopes, [facility]

## 2019-04-06 ASSESSMENT — ANXIETY QUESTIONNAIRES: GAD7 TOTAL SCORE: 7

## 2019-04-12 ENCOUNTER — TELEPHONE (OUTPATIENT)
Dept: BEHAVIORAL HEALTH | Facility: OTHER | Age: 54
End: 2019-04-12

## 2019-04-12 DIAGNOSIS — F43.10 PTSD (POST-TRAUMATIC STRESS DISORDER): Primary | ICD-10-CM

## 2019-04-12 NOTE — TELEPHONE ENCOUNTER
[facility] Phone Encounter    Encounter Activities (Refresh/Reselect every encounter): [facility] Only  Type of Contact Today: Phone call (patient / identified key support person reached)  Date of phone call: April 12, 2019.  Duration of call: 20 Min.                   Presenting Issue: Patient called in crisis.  States the apartment lex triggered his PTSD symptoms.  Was unable to calm down.  [facility] provided coping skills and crisis interventions for patient to utilize to decrease activated symptoms.    [facility] Patient: No  MI Intervention: Expressed Empathy/Understanding, Supported Autonomy, Collaboration, Evocation, Open-ended questions, Reflections: simple and complex, Change talk (evoked) and Reframe     Safety Concerns:  Risk status (Self / Other harm or suicidal ideation)  Patient denies current fears or concerns for personal safety.  Patient denies current or recent suicidal ideation or behaviors.  Patient denies current or recent homicidal ideation or behaviors.  Patient denies current or recent self injurious behavior or ideation.  Patient denies other safety concerns.    Disposition:   A safety and risk management plan has not been developed at this time, however client was given the after-hours number / 911 should there be a change in any of these risk factors.    Josy Ayala, [facility]
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
## 2019-05-02 ENCOUNTER — OFFICE VISIT (OUTPATIENT)
Dept: INTERNAL MEDICINE | Facility: OTHER | Age: 54
End: 2019-05-02
Attending: INTERNAL MEDICINE
Payer: MEDICARE

## 2019-05-02 ENCOUNTER — ANCILLARY PROCEDURE (OUTPATIENT)
Dept: GENERAL RADIOLOGY | Facility: OTHER | Age: 54
End: 2019-05-02
Attending: INTERNAL MEDICINE
Payer: MEDICARE

## 2019-05-02 ENCOUNTER — OFFICE VISIT (OUTPATIENT)
Dept: BEHAVIORAL HEALTH | Facility: OTHER | Age: 54
End: 2019-05-02
Attending: SOCIAL WORKER
Payer: MEDICARE

## 2019-05-02 ENCOUNTER — TELEPHONE (OUTPATIENT)
Dept: BEHAVIORAL HEALTH | Facility: OTHER | Age: 54
End: 2019-05-02

## 2019-05-02 VITALS
TEMPERATURE: 97.6 F | WEIGHT: 153.6 LBS | DIASTOLIC BLOOD PRESSURE: 62 MMHG | RESPIRATION RATE: 14 BRPM | BODY MASS INDEX: 22.68 KG/M2 | OXYGEN SATURATION: 98 % | HEART RATE: 80 BPM | SYSTOLIC BLOOD PRESSURE: 124 MMHG

## 2019-05-02 DIAGNOSIS — F43.10 PTSD (POST-TRAUMATIC STRESS DISORDER): Primary | ICD-10-CM

## 2019-05-02 DIAGNOSIS — F95.2 TOURETTES SYNDROME: ICD-10-CM

## 2019-05-02 DIAGNOSIS — M25.511 ACUTE PAIN OF RIGHT SHOULDER: ICD-10-CM

## 2019-05-02 DIAGNOSIS — S46.011A STRAIN OF RIGHT ROTATOR CUFF CAPSULE, INITIAL ENCOUNTER: ICD-10-CM

## 2019-05-02 DIAGNOSIS — M25.561 ACUTE PAIN OF RIGHT KNEE: ICD-10-CM

## 2019-05-02 DIAGNOSIS — F34.1 PERSISTENT DEPRESSIVE DISORDER: ICD-10-CM

## 2019-05-02 DIAGNOSIS — F41.1 GAD (GENERALIZED ANXIETY DISORDER): ICD-10-CM

## 2019-05-02 DIAGNOSIS — M25.561 ACUTE PAIN OF RIGHT KNEE: Primary | ICD-10-CM

## 2019-05-02 PROCEDURE — 90837 PSYTX W PT 60 MINUTES: CPT | Performed by: SOCIAL WORKER

## 2019-05-02 PROCEDURE — 99213 OFFICE O/P EST LOW 20 MIN: CPT | Performed by: INTERNAL MEDICINE

## 2019-05-02 PROCEDURE — G0463 HOSPITAL OUTPT CLINIC VISIT: HCPCS | Mod: 25

## 2019-05-02 PROCEDURE — G0463 HOSPITAL OUTPT CLINIC VISIT: HCPCS

## 2019-05-02 PROCEDURE — 90834 PSYTX W PT 45 MINUTES: CPT | Performed by: SOCIAL WORKER

## 2019-05-02 PROCEDURE — 73562 X-RAY EXAM OF KNEE 3: CPT | Mod: TC,RT

## 2019-05-02 ASSESSMENT — PAIN SCALES - GENERAL: PAINLEVEL: MILD PAIN (3)

## 2019-05-02 ASSESSMENT — ANXIETY QUESTIONNAIRES
7. FEELING AFRAID AS IF SOMETHING AWFUL MIGHT HAPPEN: SEVERAL DAYS
1. FEELING NERVOUS, ANXIOUS, OR ON EDGE: SEVERAL DAYS
3. WORRYING TOO MUCH ABOUT DIFFERENT THINGS: SEVERAL DAYS
2. NOT BEING ABLE TO STOP OR CONTROL WORRYING: SEVERAL DAYS
5. BEING SO RESTLESS THAT IT IS HARD TO SIT STILL: NOT AT ALL
GAD7 TOTAL SCORE: 5
6. BECOMING EASILY ANNOYED OR IRRITABLE: NOT AT ALL

## 2019-05-02 ASSESSMENT — PATIENT HEALTH QUESTIONNAIRE - PHQ9
SUM OF ALL RESPONSES TO PHQ QUESTIONS 1-9: 10
5. POOR APPETITE OR OVEREATING: SEVERAL DAYS
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
## 2019-05-02 NOTE — PATIENT INSTRUCTIONS
Patient Education     Shoulder Internal Rotation, Isometric (Strength)    1. Bend your right arm in front of your body, palm up.  Hold your wrist with your left hand.   2. Try to push your right arm inward, while pulling back with your left arm.  Try not to let either arm move.  Push and pull both arms firmly in opposite directions.  3. Hold for 5 seconds. Then relax.  4. Repeat 5 times.  5. Repeat this exercise 3 times a day, or as instructed.  Date Last Reviewed: 3/10/2016    1580-9105 [employer]. [address] [place] [address]. All rights reserved. This information is not intended as a substitute for professional medical care. Always follow your healthcare professional's instructions.           Patient Education     Standing External Rotation (Strength)    These instructions are for your right shoulder. Switch sides for your left shoulder.  6. Stand with your arms at your side. Bend your arms at a 90-degree angle. Hold a stick or cane in front of you with both hands, with your palms down. Keep your upper arms close to your body.  7. Slowly push the stick or cane to the right with your left hand. Keep holding on to the stick or cane with both hands. Keep your elbows close to your body. Feel your right shoulder stretch. Stop at the point of discomfort. Hold for 5 seconds.  8. Slowly move your arms back. Relax.  9. Repeat 5 times, or as instructed.  Date Last Reviewed: 3/10/2016    5664-3374 [employer]. [address] [place] [address]. All rights reserved. This information is not intended as a substitute for professional medical care. Always follow your healthcare professional's instructions.           Patient Education     Shoulder External Rotation, Side-Lying (Strength)    This exercise is for your right shoulder joint. Switch sides if the problem is in your left shoulder joint.  10. Lie on your left side with your head supported by a pillow. Place a small  rolled-up towel under your right elbow.  11. Hold a hand weight in your right hand. Your healthcare provider will tell you what size hand weight to use.  12. Bend your arm in front of you at a right angle. Then bend it down and bring the hand weight to the floor. Rest your forearm on your stomach.  13. Keep your elbow on the towel and slowly lift the hand weight up in front of you. Stop when the weight is raised slightly higher than your elbow.  14. Lower the weight back down.  15. Repeat 5 to 15 times, or as instructed.  Date Last Reviewed: 3/10/2016    8942-2583 [employer]. [address] [place] [address], [place] [address]. All rights reserved. This information is not intended as a substitute for professional medical care. Always follow your healthcare professional's instructions.

## 2019-05-02 NOTE — NURSING NOTE
"Chief Complaint   Patient presents with     Musculoskeletal Problem       Initial /62 (BP Location: Left arm, Patient Position: Sitting, Cuff Size: Adult Regular)   Pulse 80   Temp 97.6  F (36.4  C) (Tympanic)   Resp 14   Wt 69.7 kg (153 lb 9.6 oz)   SpO2 98%   BMI 22.68 kg/m   Estimated body mass index is 22.68 kg/m  as calculated from the following:    Height as of 4/5/18: 1.753 m (5' 9\").    Weight as of this encounter: 69.7 kg (153 lb 9.6 oz).  Medication Reconciliation: complete    Jessa Behrman, LPN  "

## 2019-05-03 ASSESSMENT — ANXIETY QUESTIONNAIRES: GAD7 TOTAL SCORE: 5

## 2019-05-06 ENCOUNTER — TELEPHONE (OUTPATIENT)
Dept: PEDIATRICS | Facility: OTHER | Age: 54
End: 2019-05-06

## 2019-05-06 DIAGNOSIS — M25.561 ACUTE PAIN OF RIGHT KNEE: ICD-10-CM

## 2019-05-06 DIAGNOSIS — M25.562 ACUTE PAIN OF LEFT KNEE: Primary | ICD-10-CM

## 2019-05-06 NOTE — TELEPHONE ENCOUNTER
Called with results, pt wife stated pt had complaints of pain and would like an orthopedic refferal.    Jessa Behrman

## 2019-05-28 ENCOUNTER — OFFICE VISIT (OUTPATIENT)
Dept: ORTHOPEDICS | Facility: OTHER | Age: 54
End: 2019-05-28
Attending: INTERNAL MEDICINE
Payer: MEDICARE

## 2019-05-28 VITALS
HEART RATE: 75 BPM | DIASTOLIC BLOOD PRESSURE: 76 MMHG | TEMPERATURE: 98.2 F | WEIGHT: 159.4 LBS | BODY MASS INDEX: 23.61 KG/M2 | OXYGEN SATURATION: 94 % | HEIGHT: 69 IN | SYSTOLIC BLOOD PRESSURE: 120 MMHG | RESPIRATION RATE: 13 BRPM

## 2019-05-28 DIAGNOSIS — G89.29 CHRONIC RIGHT SHOULDER PAIN: Primary | ICD-10-CM

## 2019-05-28 DIAGNOSIS — M25.561 ACUTE PAIN OF RIGHT KNEE: ICD-10-CM

## 2019-05-28 DIAGNOSIS — M25.511 CHRONIC RIGHT SHOULDER PAIN: Primary | ICD-10-CM

## 2019-05-28 PROCEDURE — 99203 OFFICE O/P NEW LOW 30 MIN: CPT | Performed by: ORTHOPAEDIC SURGERY

## 2019-05-28 PROCEDURE — G0463 HOSPITAL OUTPT CLINIC VISIT: HCPCS | Mod: 25

## 2019-05-28 PROCEDURE — G0463 HOSPITAL OUTPT CLINIC VISIT: HCPCS

## 2019-05-28 ASSESSMENT — MIFFLIN-ST. JEOR: SCORE: 1563.41

## 2019-05-28 ASSESSMENT — PAIN SCALES - GENERAL: PAINLEVEL: NO PAIN (1)

## 2019-05-28 NOTE — NURSING NOTE
"Chief Complaint   Patient presents with     Medication Problem     R knee pain       Initial /76 (BP Location: Right arm, Patient Position: Sitting, Cuff Size: Adult Regular)   Pulse 75   Temp 98.2  F (36.8  C) (Oral)   Resp 13   Ht 1.753 m (5' 9\")   Wt 72.3 kg (159 lb 6.4 oz)   SpO2 94%   BMI 23.54 kg/m   Estimated body mass index is 23.54 kg/m  as calculated from the following:    Height as of this encounter: 1.753 m (5' 9\").    Weight as of this encounter: 72.3 kg (159 lb 6.4 oz).  Medication Reconciliation: complete    Jessa Behrman, LPN  "

## 2019-05-30 NOTE — PROGRESS NOTES
Subjective     Vinny Hsu is a 53 year old male who presents to clinic today for the following health issues:    HPI   Musculoskeletal problem/pain      Duration: Onging    Description  Location: right shoulder and knee    Therapies tried and outcome: rest/inactivity helped      He has been doing the shoulder exercises that he was given.  He also has been more conscious on carrying items and how he opens dors.  He reports moderate improvement.  He did see Dr. Frank who suggested that Salvador likely had an MCL strain and tendonitis of the rotator cuff and he advised following through with the rotator cuff exercises which Salvador has been doin for the past 10 days.    -------------------------------------    Patient Active Problem List   Diagnosis     MARY (generalized anxiety disorder)     Acquired hypothyroidism     Tourettes syndrome     Uncomplicated alcohol dependence (H)     PTSD (post-traumatic stress disorder)     Persistent depressive disorder     Past Surgical History:   Procedure Laterality Date     DENTAL SURGERY Bilateral     ALL UPPER TEETH GONE     LAPAROSCOPIC HERNIORRHAPHY INGUINAL Bilateral 7/13/2017    Procedure: LAPAROSCOPIC HERNIORRHAPHY INGUINAL;  LAPAROSCOPIC REPAIR BILATERAL INGUINAL HERNIA'S;  Surgeon: Bridget Hemphill MD;  Location: [facility]       Social History     Tobacco Use     Smoking status: Current Every Day Smoker     Types: Cigars     Start date: 1/1/1970     Smokeless tobacco: Never Used     Tobacco comment: quit for 3 years   Substance Use Topics     Alcohol use: Yes     Comment: Beer daily     Family History   Problem Relation Age of Onset     Other Cancer Mother      Coronary Artery Disease Paternal Grandfather            -------------------------------------  Reviewed and updated as needed this visit by Provider         Review of Systems   ROS COMP: Constitutional, HEENT, cardiovascular, pulmonary, gi and gu systems are negative, except as otherwise noted.      Objective    BP  128/82 (BP Location: Right arm, Patient Position: Sitting, Cuff Size: Adult Regular)   Pulse 85   Temp 97.9  F (36.6  C)   Wt 71.7 kg (158 lb)   SpO2 96%   BMI 23.33 kg/m    There is no height or weight on file to calculate BMI.  Physical Exam   GENERAL: healthy, alert and no distress  MS: no gross musculoskeletal defects noted, no edema.  His have normal upper and lower extremity strength with basic motions.  NEURO: Normal strength and tone, mentation intact and speech normal  PSYCH: mentation appears normal, affect normal/bright    Diagnostic Test Results:  Labs reviewed in Epic            ASSESSMENT /PLAN:    (M25.561) Acute pain of right knee  (primary encounter diagnosis)  Comment: Improving pain with avoidance of torquing motions.  Plan:   He will continue avoiding torquing type of motions and he will contact the office in the case that his knee pain worsens.    (M25.599) Acute pain of right shoulder  Comment: Improving.  Plan:   He will continue his rotator cuff exercises.            Follow up with Provider - JARED Villaseñor DO

## 2019-06-05 ENCOUNTER — OFFICE VISIT (OUTPATIENT)
Dept: BEHAVIORAL HEALTH | Facility: OTHER | Age: 54
End: 2019-06-05
Attending: SOCIAL WORKER
Payer: MEDICARE

## 2019-06-05 DIAGNOSIS — R69 DIAGNOSIS DEFERRED: Primary | ICD-10-CM

## 2019-06-05 PROCEDURE — 99207 ZZC NO CHARGE LOS: CPT | Performed by: SOCIAL WORKER

## 2019-06-05 ASSESSMENT — ANXIETY QUESTIONNAIRES
2. NOT BEING ABLE TO STOP OR CONTROL WORRYING: NEARLY EVERY DAY
3. WORRYING TOO MUCH ABOUT DIFFERENT THINGS: NEARLY EVERY DAY
7. FEELING AFRAID AS IF SOMETHING AWFUL MIGHT HAPPEN: NEARLY EVERY DAY
IF YOU CHECKED OFF ANY PROBLEMS ON THIS QUESTIONNAIRE, HOW DIFFICULT HAVE THESE PROBLEMS MADE IT FOR YOU TO DO YOUR WORK, TAKE CARE OF THINGS AT HOME, OR GET ALONG WITH OTHER PEOPLE: EXTREMELY DIFFICULT
5. BEING SO RESTLESS THAT IT IS HARD TO SIT STILL: SEVERAL DAYS
1. FEELING NERVOUS, ANXIOUS, OR ON EDGE: NEARLY EVERY DAY
6. BECOMING EASILY ANNOYED OR IRRITABLE: MORE THAN HALF THE DAYS
GAD7 TOTAL SCORE: 18

## 2019-06-05 ASSESSMENT — PATIENT HEALTH QUESTIONNAIRE - PHQ9
SUM OF ALL RESPONSES TO PHQ QUESTIONS 1-9: 18
5. POOR APPETITE OR OVEREATING: NEARLY EVERY DAY

## 2019-06-05 NOTE — PROGRESS NOTES
Patient was seen with spouse for couples counseling session.  Did not receive any individual therapeutic services at today's appointment.    DURAN López, [facility], [facility]  June 5, 2019

## 2019-06-06 ENCOUNTER — OFFICE VISIT (OUTPATIENT)
Dept: INTERNAL MEDICINE | Facility: OTHER | Age: 54
End: 2019-06-06
Attending: INTERNAL MEDICINE
Payer: MEDICARE

## 2019-06-06 VITALS
HEART RATE: 85 BPM | DIASTOLIC BLOOD PRESSURE: 82 MMHG | OXYGEN SATURATION: 96 % | BODY MASS INDEX: 23.33 KG/M2 | WEIGHT: 158 LBS | TEMPERATURE: 97.9 F | SYSTOLIC BLOOD PRESSURE: 128 MMHG

## 2019-06-06 DIAGNOSIS — M25.511 ACUTE PAIN OF RIGHT SHOULDER: ICD-10-CM

## 2019-06-06 DIAGNOSIS — M25.561 ACUTE PAIN OF RIGHT KNEE: Primary | ICD-10-CM

## 2019-06-06 PROCEDURE — G0463 HOSPITAL OUTPT CLINIC VISIT: HCPCS

## 2019-06-06 PROCEDURE — 99213 OFFICE O/P EST LOW 20 MIN: CPT | Performed by: INTERNAL MEDICINE

## 2019-06-06 PROCEDURE — G0463 HOSPITAL OUTPT CLINIC VISIT: HCPCS | Mod: 25

## 2019-06-06 ASSESSMENT — ANXIETY QUESTIONNAIRES: GAD7 TOTAL SCORE: 18

## 2019-06-06 ASSESSMENT — PAIN SCALES - GENERAL: PAINLEVEL: NO PAIN (0)

## 2019-06-06 NOTE — NURSING NOTE
"Chief Complaint   Patient presents with     Musculoskeletal Problem     Shoulder F/U       Initial /82 (BP Location: Right arm, Patient Position: Sitting, Cuff Size: Adult Regular)   Pulse 85   Temp 97.9  F (36.6  C)   Wt 71.7 kg (158 lb)   SpO2 96%   BMI 23.33 kg/m   Estimated body mass index is 23.33 kg/m  as calculated from the following:    Height as of 5/28/19: 1.753 m (5' 9\").    Weight as of this encounter: 71.7 kg (158 lb).  Medication Reconciliation: complete    Jessa Behrman, LPN  "

## 2019-06-19 ENCOUNTER — OFFICE VISIT (OUTPATIENT)
Dept: BEHAVIORAL HEALTH | Facility: OTHER | Age: 54
End: 2019-06-19
Attending: SOCIAL WORKER
Payer: MEDICARE

## 2019-06-19 DIAGNOSIS — F34.1 PERSISTENT DEPRESSIVE DISORDER: ICD-10-CM

## 2019-06-19 DIAGNOSIS — F41.1 GAD (GENERALIZED ANXIETY DISORDER): Primary | ICD-10-CM

## 2019-06-19 PROCEDURE — 90832 PSYTX W PT 30 MINUTES: CPT | Performed by: SOCIAL WORKER

## 2019-06-19 ASSESSMENT — ANXIETY QUESTIONNAIRES
1. FEELING NERVOUS, ANXIOUS, OR ON EDGE: NEARLY EVERY DAY
3. WORRYING TOO MUCH ABOUT DIFFERENT THINGS: MORE THAN HALF THE DAYS
5. BEING SO RESTLESS THAT IT IS HARD TO SIT STILL: SEVERAL DAYS
GAD7 TOTAL SCORE: 16
2. NOT BEING ABLE TO STOP OR CONTROL WORRYING: NEARLY EVERY DAY
7. FEELING AFRAID AS IF SOMETHING AWFUL MIGHT HAPPEN: MORE THAN HALF THE DAYS
IF YOU CHECKED OFF ANY PROBLEMS ON THIS QUESTIONNAIRE, HOW DIFFICULT HAVE THESE PROBLEMS MADE IT FOR YOU TO DO YOUR WORK, TAKE CARE OF THINGS AT HOME, OR GET ALONG WITH OTHER PEOPLE: VERY DIFFICULT
6. BECOMING EASILY ANNOYED OR IRRITABLE: MORE THAN HALF THE DAYS

## 2019-06-19 ASSESSMENT — PATIENT HEALTH QUESTIONNAIRE - PHQ9
SUM OF ALL RESPONSES TO PHQ QUESTIONS 1-9: 15
5. POOR APPETITE OR OVEREATING: NEARLY EVERY DAY
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
## 2019-06-20 ASSESSMENT — ANXIETY QUESTIONNAIRES: GAD7 TOTAL SCORE: 16

## 2019-07-25 ENCOUNTER — OFFICE VISIT (OUTPATIENT)
Dept: BEHAVIORAL HEALTH | Facility: OTHER | Age: 54
End: 2019-07-25
Attending: SOCIAL WORKER
Payer: MEDICARE

## 2019-07-25 DIAGNOSIS — F43.10 PTSD (POST-TRAUMATIC STRESS DISORDER): ICD-10-CM

## 2019-07-25 DIAGNOSIS — F41.1 GAD (GENERALIZED ANXIETY DISORDER): Primary | ICD-10-CM

## 2019-07-25 DIAGNOSIS — F34.1 PERSISTENT DEPRESSIVE DISORDER: ICD-10-CM

## 2019-07-25 PROCEDURE — 90832 PSYTX W PT 30 MINUTES: CPT | Performed by: SOCIAL WORKER

## 2019-07-25 ASSESSMENT — ANXIETY QUESTIONNAIRES
7. FEELING AFRAID AS IF SOMETHING AWFUL MIGHT HAPPEN: NEARLY EVERY DAY
GAD7 TOTAL SCORE: 18
2. NOT BEING ABLE TO STOP OR CONTROL WORRYING: MORE THAN HALF THE DAYS
3. WORRYING TOO MUCH ABOUT DIFFERENT THINGS: NEARLY EVERY DAY
5. BEING SO RESTLESS THAT IT IS HARD TO SIT STILL: SEVERAL DAYS
6. BECOMING EASILY ANNOYED OR IRRITABLE: NEARLY EVERY DAY
1. FEELING NERVOUS, ANXIOUS, OR ON EDGE: NEARLY EVERY DAY

## 2019-07-25 ASSESSMENT — PATIENT HEALTH QUESTIONNAIRE - PHQ9
SUM OF ALL RESPONSES TO PHQ QUESTIONS 1-9: 16
5. POOR APPETITE OR OVEREATING: NEARLY EVERY DAY
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
## 2019-07-26 ASSESSMENT — ANXIETY QUESTIONNAIRES: GAD7 TOTAL SCORE: 18

## 2019-08-20 ENCOUNTER — OFFICE VISIT (OUTPATIENT)
Dept: BEHAVIORAL HEALTH | Facility: OTHER | Age: 54
End: 2019-08-20
Attending: SOCIAL WORKER
Payer: MEDICARE

## 2019-08-20 DIAGNOSIS — F34.1 PERSISTENT DEPRESSIVE DISORDER: Primary | ICD-10-CM

## 2019-08-20 PROCEDURE — 90832 PSYTX W PT 30 MINUTES: CPT | Performed by: SOCIAL WORKER

## 2019-08-20 ASSESSMENT — ANXIETY QUESTIONNAIRES
2. NOT BEING ABLE TO STOP OR CONTROL WORRYING: SEVERAL DAYS
3. WORRYING TOO MUCH ABOUT DIFFERENT THINGS: MORE THAN HALF THE DAYS
5. BEING SO RESTLESS THAT IT IS HARD TO SIT STILL: NOT AT ALL
6. BECOMING EASILY ANNOYED OR IRRITABLE: NEARLY EVERY DAY
1. FEELING NERVOUS, ANXIOUS, OR ON EDGE: MORE THAN HALF THE DAYS
7. FEELING AFRAID AS IF SOMETHING AWFUL MIGHT HAPPEN: MORE THAN HALF THE DAYS
GAD7 TOTAL SCORE: 13
IF YOU CHECKED OFF ANY PROBLEMS ON THIS QUESTIONNAIRE, HOW DIFFICULT HAVE THESE PROBLEMS MADE IT FOR YOU TO DO YOUR WORK, TAKE CARE OF THINGS AT HOME, OR GET ALONG WITH OTHER PEOPLE: EXTREMELY DIFFICULT

## 2019-08-20 ASSESSMENT — PATIENT HEALTH QUESTIONNAIRE - PHQ9
5. POOR APPETITE OR OVEREATING: NEARLY EVERY DAY
SUM OF ALL RESPONSES TO PHQ QUESTIONS 1-9: 13
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
## 2019-08-21 ASSESSMENT — ANXIETY QUESTIONNAIRES: GAD7 TOTAL SCORE: 13

## 2019-08-27 ENCOUNTER — OFFICE VISIT (OUTPATIENT)
Dept: BEHAVIORAL HEALTH | Facility: OTHER | Age: 54
End: 2019-08-27
Attending: SOCIAL WORKER
Payer: MEDICARE

## 2019-08-27 DIAGNOSIS — F34.1 PERSISTENT DEPRESSIVE DISORDER: ICD-10-CM

## 2019-08-27 DIAGNOSIS — F43.10 PTSD (POST-TRAUMATIC STRESS DISORDER): ICD-10-CM

## 2019-08-27 DIAGNOSIS — F41.1 GAD (GENERALIZED ANXIETY DISORDER): Primary | ICD-10-CM

## 2019-08-27 PROCEDURE — 90832 PSYTX W PT 30 MINUTES: CPT | Performed by: SOCIAL WORKER
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
## 2019-08-28 NOTE — PROGRESS NOTES
Subjective     Vinny Hsu is a 53 year old male who presents to clinic today for the following health issues:    HPI   Musculoskeletal problem/pain      Duration: ongoing 1 week     Description  Location: left elbow     Intensity:  moderate    Accompanying signs and symptoms: weakness of left arm     History  Previous similar problem: YES  Previous evaluation:  none    Precipitating or alleviating factors:  Trauma or overuse: YES  Aggravating factors include: putting pressure on arm, moving arm, grapsing    Therapies tried and outcome: nothing        He has pain over the aspect of the left elbow.  The pain increases with gripping items.  -------------------------------------        Patient Active Problem List   Diagnosis     MARY (generalized anxiety disorder)     Acquired hypothyroidism     Tourettes syndrome     Uncomplicated alcohol dependence (H)     PTSD (post-traumatic stress disorder)     Persistent depressive disorder     Acute pain of right knee     Acute pain of right shoulder     Past Surgical History:   Procedure Laterality Date     DENTAL SURGERY Bilateral     ALL UPPER TEETH GONE     LAPAROSCOPIC HERNIORRHAPHY INGUINAL Bilateral 7/13/2017    Procedure: LAPAROSCOPIC HERNIORRHAPHY INGUINAL;  LAPAROSCOPIC REPAIR BILATERAL INGUINAL HERNIA'S;  Surgeon: Bridget Hemphill MD;  Location: [facility] OR       Social History     Tobacco Use     Smoking status: Current Every Day Smoker     Types: Cigars     Start date: 1/1/1970     Smokeless tobacco: Never Used     Tobacco comment: quit for 3 years   Substance Use Topics     Alcohol use: Yes     Comment: Beer daily     Family History   Problem Relation Age of Onset     Other Cancer Mother      Coronary Artery Disease Paternal Grandfather          Throat Discomfort:  He reports increased nasal congestion and has been using sinus rinse which has been unsuccessful.  He has increased post nasal drip.  -------------------------------------  Reviewed and updated as needed  this visit by Provider         Review of Systems   ROS COMP: Constitutional, HEENT, cardiovascular, pulmonary, gi and gu systems are negative, except as otherwise noted.      Objective    /80 (BP Location: Right arm, Patient Position: Chair, Cuff Size: Adult Regular)   Pulse 73   Temp 98.4  F (36.9  C) (Tympanic)   SpO2 94%   There is no height or weight on file to calculate BMI.  Physical Exam   GENERAL: healthy, alert and no distress  EYES: Eyes grossly normal to inspection  HENT: tonsillar exudate  MOUTH: shows some white to gray plaque over the posterior 1/2 of the tongue.  NECK: no adenopathy, no asymmetry, masses, or scars and thyroid normal to palpation  MS:   There is tenderness over the lateral epicondyle of the left arm.  There is no pain induced with the wrist flexed against resistance.  Pain is produced in wrist extension against resistance.      Diagnostic Test Results:  Labs reviewed in Epic  none         ASSESSMENT /PLAN:  (M77.12) Lateral epicondylitis of left elbow  (primary encounter diagnosis)  Comment:   Plan:   capsaicin (ZOSTRIX) 0.025 % external cream TID     (B37.0) Thrush  Comment:   Plan:  fluconazole (DIFLUCAN) 200 MG tablet daily for 1 month                 Follow up with Provider - 3 weeks for elbow pain and throat problems.        Elijah Villaseñor DO, DO

## 2019-09-06 ENCOUNTER — OFFICE VISIT (OUTPATIENT)
Dept: INTERNAL MEDICINE | Facility: OTHER | Age: 54
End: 2019-09-06
Attending: INTERNAL MEDICINE
Payer: MEDICARE

## 2019-09-06 VITALS
SYSTOLIC BLOOD PRESSURE: 120 MMHG | TEMPERATURE: 98.4 F | DIASTOLIC BLOOD PRESSURE: 80 MMHG | HEART RATE: 73 BPM | OXYGEN SATURATION: 94 %

## 2019-09-06 DIAGNOSIS — M77.12 LATERAL EPICONDYLITIS OF LEFT ELBOW: Primary | ICD-10-CM

## 2019-09-06 DIAGNOSIS — B37.0 THRUSH: ICD-10-CM

## 2019-09-06 PROCEDURE — G0463 HOSPITAL OUTPT CLINIC VISIT: HCPCS

## 2019-09-06 PROCEDURE — 99213 OFFICE O/P EST LOW 20 MIN: CPT | Performed by: INTERNAL MEDICINE

## 2019-09-06 RX ORDER — CAPSAICIN 0.025 %
CREAM (GRAM) TOPICAL
Qty: 120 G | Refills: 1 | Status: SHIPPED | OUTPATIENT
Start: 2019-09-06

## 2019-09-06 RX ORDER — FLUCONAZOLE 200 MG/1
200 TABLET ORAL DAILY
Qty: 30 TABLET | Refills: 0 | Status: SHIPPED | OUTPATIENT
Start: 2019-09-06

## 2019-09-06 ASSESSMENT — PAIN SCALES - GENERAL: PAINLEVEL: MILD PAIN (3)

## 2019-09-06 NOTE — LETTER
My Depression Action Plan  Name: Vinny Hsu   Date of Birth 1965  Date: 9/3/2019    My doctor: Elijah Villaseñor   My clinic: [facility]  [address]  [place] [address]  [phone]          GREEN    ZONE   Good Control    What it looks like:     Things are going generally well. You have normal up s and down s. You may even feel depressed from time to time, but bad moods usually last less than a day.   What you need to do:  1. Continue to care for yourself (see self care plan)  2. Check your depression survival kit and update it as needed  3. Follow your physician s recommendations including any medication.  4. Do not stop taking medication unless you consult with your physician first.           YELLOW         ZONE Getting Worse    What it looks like:     Depression is starting to interfere with your life.     It may be hard to get out of bed; you may be starting to isolate yourself from others.    Symptoms of depression are starting to last most all day and this has happened for several days.     You may have suicidal thoughts but they are not constant.   What you need to do:     1. Call your care team, your response to treatment will improve if you keep your care team informed of your progress. Yellow periods are signs an adjustment may need to be made.     2. Continue your self-care, even if you have to fake it!    3. Talk to someone in your support network    4. Open up your depression survival kit           RED    ZONE Medical Alert - Get Help    What it looks like:     Depression is seriously interfering with your life.     You may experience these or other symptoms: You can t get out of bed most days, can t work or engage in other necessary activities, you have trouble taking care of basic hygiene, or basic responsibilities, thoughts of suicide or death that will not go away, self-injurious behavior.     What you need to do:  1. Call your care team  and request a same-day appointment. If they are not available (weekends or after hours) call your local crisis line, emergency room or 911.            Depression Self Care Plan / Survival Kit    Self-Care for Depression  Here s the deal. Your body and mind are really not as separate as most people think.  What you do and think affects how you feel and how you feel influences what you do and think. This means if you do things that people who feel good do, it will help you feel better.  Sometimes this is all it takes.  There is also a place for medication and therapy depending on how severe your depression is, so be sure to consult with your medical provider and/ or Behavioral Health Consultant if your symptoms are worsening or not improving.     In order to better manage my stress, I will:    Exercise  Get some form of exercise, every day. This will help reduce pain and release endorphins, the  feel good  chemicals in your brain. This is almost as good as taking antidepressants!  This is not the same as joining a gym and then never going! (they count on that by the way ) It can be as simple as just going for a walk or doing some gardening, anything that will get you moving.      Hygiene   Maintain good hygiene (Get out of bed in the morning, Make your bed, Brush your teeth, Take a shower, and Get dressed like you were going to work, even if you are unemployed).  If your clothes don't fit try to get ones that do.    Diet  I will strive to eat foods that are good for me, drink plenty of water, and avoid excessive sugar, caffeine, alcohol, and other mood-altering substances.  Some foods that are helpful in depression are: complex carbohydrates, B vitamins, flaxseed, fish or fish oil, fresh fruits and vegetables.    Psychotherapy  I agree to participate in Individual Therapy (if recommended).    Medication  If prescribed medications, I agree to take them.  Missing doses can result in serious side effects.  I understand  that drinking alcohol, or other illicit drug use, may cause potential side effects.  I will not stop my medication abruptly without first discussing it with my provider.    Staying Connected With Others  I will stay in touch with my friends, family members, and my primary care provider/team.    Use your imagination  Be creative.  We all have a creative side; it doesn t matter if it s oil painting, sand castles, or mud pies! This will also kick up the endorphins.    Witness Beauty  (AKA stop and smell the roses) Take a look outside, even in mid-winter. Notice colors, textures. Watch the squirrels and birds.     Service to others  Be of service to others.  There is always someone else in need.  By helping others we can  get out of ourselves  and remember the really important things.  This also provides opportunities for practicing all the other parts of the program.    Humor  Laugh and be silly!  Adjust your TV habits for less news and crime-drama and more comedy.    Control your stress  Try breathing deep, massage therapy, biofeedback, and meditation. Find time to relax each day.     My support system    Clinic Contact:  Phone number:    Contact 1:  Phone number:    Contact 2:  Phone number:    Jehovah's witness/:  Phone number:    Therapist:  Phone number:    Local crisis center:    Phone number:    Other community support:  Phone number:

## 2019-09-06 NOTE — NURSING NOTE
"Chief Complaint   Patient presents with     Musculoskeletal Problem     Throat Problem       Initial /80 (BP Location: Right arm, Patient Position: Chair, Cuff Size: Adult Regular)   Pulse 73   Temp 98.4  F (36.9  C) (Tympanic)   SpO2 94%  Estimated body mass index is 23.33 kg/m  as calculated from the following:    Height as of 5/28/19: 1.753 m (5' 9\").    Weight as of 6/6/19: 71.7 kg (158 lb).  Medication Reconciliation: complete     Jhonny Wong LPN    "

## 2020-02-12 DIAGNOSIS — G47.00 PERSISTENT INSOMNIA: ICD-10-CM

## 2020-02-12 DIAGNOSIS — F33.1 MAJOR DEPRESSIVE DISORDER, RECURRENT EPISODE, MODERATE (H): ICD-10-CM

## 2020-02-12 RX ORDER — SERTRALINE HYDROCHLORIDE 100 MG/1
200 TABLET, FILM COATED ORAL DAILY
Qty: 60 TABLET | Refills: 0 | Status: SHIPPED | OUTPATIENT
Start: 2020-02-12

## 2020-02-12 RX ORDER — TRAZODONE HYDROCHLORIDE 50 MG/1
50 TABLET, FILM COATED ORAL AT BEDTIME
Qty: 60 TABLET | Refills: 0 | Status: SHIPPED | OUTPATIENT
Start: 2020-02-12

## 2020-04-06 DIAGNOSIS — N40.1 BPH WITH URINARY OBSTRUCTION: ICD-10-CM

## 2020-04-06 DIAGNOSIS — N13.8 BPH WITH URINARY OBSTRUCTION: ICD-10-CM

## 2020-04-06 RX ORDER — TAMSULOSIN HYDROCHLORIDE 0.4 MG/1
CAPSULE ORAL
Qty: 30 CAPSULE | Refills: 0 | Status: SHIPPED | OUTPATIENT
Start: 2020-04-06 | End: 2020-06-11

## 2020-06-11 DIAGNOSIS — N40.1 BPH WITH URINARY OBSTRUCTION: ICD-10-CM

## 2020-06-11 DIAGNOSIS — N13.8 BPH WITH URINARY OBSTRUCTION: ICD-10-CM

## 2020-06-11 RX ORDER — TAMSULOSIN HYDROCHLORIDE 0.4 MG/1
CAPSULE ORAL
Qty: 30 CAPSULE | Refills: 0 | Status: SHIPPED | OUTPATIENT
Start: 2020-06-11 | End: 2020-08-10

## 2021-01-12 DIAGNOSIS — N40.1 BPH WITH URINARY OBSTRUCTION: ICD-10-CM

## 2021-01-12 DIAGNOSIS — N13.8 BPH WITH URINARY OBSTRUCTION: ICD-10-CM

## 2021-01-13 RX ORDER — TAMSULOSIN HYDROCHLORIDE 0.4 MG/1
CAPSULE ORAL
Qty: 30 CAPSULE | Refills: 0 | Status: SHIPPED | OUTPATIENT
Start: 2021-01-13 | End: 2021-02-23

## 2021-01-13 NOTE — TELEPHONE ENCOUNTER
tamsulosin (FLOMAX) 0.4 MG capsule     Last Written Prescription Date:  8/10/20  Last Fill Quantity: 30,   # refills: 2  Last Office Visit: 9/6/2019  Future Office visit:       Routing refill request to provider for review/approval

## 2021-02-22 DIAGNOSIS — N13.8 BPH WITH URINARY OBSTRUCTION: ICD-10-CM

## 2021-02-22 DIAGNOSIS — N40.1 BPH WITH URINARY OBSTRUCTION: ICD-10-CM

## 2021-02-23 RX ORDER — TAMSULOSIN HYDROCHLORIDE 0.4 MG/1
CAPSULE ORAL
Qty: 30 CAPSULE | Refills: 0 | Status: SHIPPED | OUTPATIENT
Start: 2021-02-23 | End: 2021-04-19

## 2021-02-23 NOTE — TELEPHONE ENCOUNTER
tamsulosin (FLOMAX) 0.4 MG capsule     Last Written Prescription Date:  1/13/21  Last Fill Quantity: 30,   # refills: 0  Last Office Visit: 9/16/2019  Future Office visit:       Routing refill request to provider for review/approval

## 2021-04-19 DIAGNOSIS — N40.1 BPH WITH URINARY OBSTRUCTION: ICD-10-CM

## 2021-04-19 DIAGNOSIS — N13.8 BPH WITH URINARY OBSTRUCTION: ICD-10-CM

## 2021-04-19 RX ORDER — TAMSULOSIN HYDROCHLORIDE 0.4 MG/1
CAPSULE ORAL
Qty: 30 CAPSULE | Refills: 0 | Status: SHIPPED | OUTPATIENT
Start: 2021-04-19 | End: 2021-06-22

## 2021-04-27 NOTE — PROGRESS NOTES
"    Assessment & Plan     (Z76.89) Encounter to establish care  (primary encounter diagnosis)  Comment: Return near future for physical and fasting labs                   Tobacco Cessation:   reports that he has been smoking cigars. He started smoking about 51 years ago. He has never used smokeless tobacco.          No follow-ups on file.    ESTHER Bentley  [facility]    Andriy Casillas is a 55 year old who presents for the following health issues     HPI     New Patient/Transfer of Care    Patient has severe PSTD (Depression & Anxiety)    Review of Systems   Constitutional, HEENT, cardiovascular, pulmonary, gi and gu systems are negative, except as otherwise noted.      Objective    /72 (BP Location: Right arm, Patient Position: Chair, Cuff Size: Adult Regular)   Pulse 88   Temp 97.6  F (36.4  C) (Tympanic)   Resp 14   Ht 1.734 m (5' 8.25\")   Wt 68.1 kg (150 lb 3.2 oz)   SpO2 95%   BMI 22.67 kg/m    Body mass index is 22.67 kg/m .  Physical Exam   GENERAL: healthy, alert and no distress                "

## 2021-04-28 ENCOUNTER — OFFICE VISIT (OUTPATIENT)
Dept: FAMILY MEDICINE | Facility: OTHER | Age: 56
End: 2021-04-28
Attending: PHYSICIAN ASSISTANT
Payer: MEDICARE

## 2021-04-28 VITALS
TEMPERATURE: 97.6 F | RESPIRATION RATE: 14 BRPM | SYSTOLIC BLOOD PRESSURE: 124 MMHG | BODY MASS INDEX: 22.76 KG/M2 | HEART RATE: 88 BPM | WEIGHT: 150.2 LBS | HEIGHT: 68 IN | OXYGEN SATURATION: 95 % | DIASTOLIC BLOOD PRESSURE: 72 MMHG

## 2021-04-28 DIAGNOSIS — Z76.89 ENCOUNTER TO ESTABLISH CARE: Primary | ICD-10-CM

## 2021-04-28 PROCEDURE — G0463 HOSPITAL OUTPT CLINIC VISIT: HCPCS

## 2021-04-28 PROCEDURE — 99202 OFFICE O/P NEW SF 15 MIN: CPT | Performed by: PHYSICIAN ASSISTANT

## 2021-04-28 ASSESSMENT — PAIN SCALES - GENERAL: PAINLEVEL: NO PAIN (0)

## 2021-04-28 ASSESSMENT — MIFFLIN-ST. JEOR: SCORE: 1499.77

## 2021-04-28 NOTE — NURSING NOTE
"Chief Complaint   Patient presents with     Establish Care       Initial /72 (BP Location: Right arm, Patient Position: Chair, Cuff Size: Adult Regular)   Pulse 88   Temp 97.6  F (36.4  C) (Tympanic)   Resp 14   Ht 1.734 m (5' 8.25\")   Wt 68.1 kg (150 lb 3.2 oz)   SpO2 95%   BMI 22.67 kg/m   Estimated body mass index is 22.67 kg/m  as calculated from the following:    Height as of this encounter: 1.734 m (5' 8.25\").    Weight as of this encounter: 68.1 kg (150 lb 3.2 oz).  Medication Reconciliation: complete  Renate Ball LPN  "

## 2021-06-21 DIAGNOSIS — N13.8 BPH WITH URINARY OBSTRUCTION: ICD-10-CM

## 2021-06-21 DIAGNOSIS — N40.1 BPH WITH URINARY OBSTRUCTION: ICD-10-CM

## 2021-06-22 RX ORDER — TAMSULOSIN HYDROCHLORIDE 0.4 MG/1
CAPSULE ORAL
Qty: 30 CAPSULE | Refills: 0 | Status: SHIPPED | OUTPATIENT
Start: 2021-06-22 | End: 2021-08-12

## 2021-06-22 NOTE — TELEPHONE ENCOUNTER
tamsulosin (FLOMAX) 0.4 MG capsule     Last Written Prescription Date:  4/19/21  Last Fill Quantity: 30,   # refills: 0  Last Office Visit: 5/6/21  Future Office visit:       Routing refill request to provider for review/approval

## 2021-09-13 ENCOUNTER — TRANSCRIBE ORDERS (OUTPATIENT)
Dept: OTHER | Age: 56
End: 2021-09-13

## 2021-09-13 DIAGNOSIS — H69.90 ETD (EUSTACHIAN TUBE DYSFUNCTION): Primary | ICD-10-CM

## 2021-09-27 ENCOUNTER — TELEPHONE (OUTPATIENT)
Dept: OTOLARYNGOLOGY | Facility: OTHER | Age: 56
End: 2021-09-27

## 2021-09-27 NOTE — TELEPHONE ENCOUNTER
Spoke to Elizabeth. She will have Vinny call us back and schedule HEA with his medicare insurance and the ENT appt with the [facility].

## 2021-09-28 ENCOUNTER — TELEPHONE (OUTPATIENT)
Dept: OTOLARYNGOLOGY | Facility: OTHER | Age: 56
End: 2021-09-28

## 2021-09-28 NOTE — TELEPHONE ENCOUNTER
I explained to Vinny that he can get the audio appt on his medicare insurance , pay out of pocket or go elswhere for this part with the [facility].  Then we can schedule ENT.  He did not know what he wanted to do.  He said he was waiting for a psyche eval.  Could I call him back Friday.  I agreed.

## 2021-10-01 DIAGNOSIS — H91.93 DECREASED HEARING OF BOTH EARS: Primary | ICD-10-CM

## 2022-01-14 DIAGNOSIS — N40.1 BPH WITH URINARY OBSTRUCTION: ICD-10-CM

## 2022-01-14 DIAGNOSIS — N13.8 BPH WITH URINARY OBSTRUCTION: ICD-10-CM

## 2022-01-17 NOTE — TELEPHONE ENCOUNTER
Needs new PCP    Flomax      Last Written Prescription Date:  11.29.21  Last Fill Quantity: #30,   # refills: 0  Last Office Visit: 4.28.21  Future Office visit:       Routing refill request to provider for review/approval because:

## 2022-01-18 NOTE — TELEPHONE ENCOUNTER
Attempt # 1  Outcome: no answer no voicemail  Comment: sent a letter to call back and set up an est care visit

## 2022-01-25 RX ORDER — TAMSULOSIN HYDROCHLORIDE 0.4 MG/1
CAPSULE ORAL
Qty: 30 CAPSULE | Refills: 0 | OUTPATIENT
Start: 2022-01-25

## 2022-01-25 NOTE — TELEPHONE ENCOUNTER
Please close encounter    Attempt # 2  Outcome: NO answer NO voicemail  Comment: Letter already sent. Needs est care visit.

## 2022-09-06 NOTE — ANESTHESIA CARE TRANSFER NOTE
Patient: Vinny Hsu    Procedure(s):  LAPAROSCOPIC REPAIR BILATERAL INGUINAL HERNIA'S - Wound Class: I-Clean    Diagnosis: BILATERAL INGUINAL HERNIA'S  Diagnosis Additional Information: No value filed.    Anesthesia Type:   General, ETT     Note:  Airway :Nasal Cannula  Patient transferred to:PACU  Comments: Anesthesia Care Transfer Note    Patient: Vinny Hsu    Transferred to: PACU    Patient vital signs: Stable    Airway: None    To PACU on supplemental O2.  Placed on monitor.  Report given to RN, all questions answered.  VSS.    Rick Diaz CRNA  7/13/2017      Vitals: (Last set prior to Anesthesia Care Transfer)    CRNA VITALS  7/13/2017 1009 - 7/13/2017 1047      7/13/2017             Pulse: 77    SpO2: 100 %    Resp Rate (observed): 15                Electronically Signed By: BERNARDO Huffman CRNA  July 13, 2017  10:47 AM  
Additional Progress Note...

## 2023-01-18 ENCOUNTER — TELEPHONE (OUTPATIENT)
Dept: OTOLARYNGOLOGY | Facility: OTHER | Age: 58
End: 2023-01-18

## 2023-03-06 ENCOUNTER — OFFICE VISIT (OUTPATIENT)
Dept: OTOLARYNGOLOGY | Facility: OTHER | Age: 58
End: 2023-03-06
Attending: PHYSICIAN ASSISTANT
Payer: COMMERCIAL

## 2023-03-06 VITALS
WEIGHT: 150 LBS | TEMPERATURE: 97.9 F | DIASTOLIC BLOOD PRESSURE: 70 MMHG | SYSTOLIC BLOOD PRESSURE: 150 MMHG | HEIGHT: 69 IN | OXYGEN SATURATION: 94 % | HEART RATE: 77 BPM | BODY MASS INDEX: 22.22 KG/M2

## 2023-03-06 DIAGNOSIS — Z72.0 TOBACCO USE: ICD-10-CM

## 2023-03-06 DIAGNOSIS — R09.82 POST-NASAL DRAINAGE: ICD-10-CM

## 2023-03-06 DIAGNOSIS — F10.20 UNCOMPLICATED ALCOHOL DEPENDENCE (H): ICD-10-CM

## 2023-03-06 DIAGNOSIS — R09.A2 GLOBUS SENSATION: Primary | ICD-10-CM

## 2023-03-06 DIAGNOSIS — K21.9 LPRD (LARYNGOPHARYNGEAL REFLUX DISEASE): ICD-10-CM

## 2023-03-06 PROCEDURE — 99214 OFFICE O/P EST MOD 30 MIN: CPT | Mod: 25 | Performed by: PHYSICIAN ASSISTANT

## 2023-03-06 PROCEDURE — G0463 HOSPITAL OUTPT CLINIC VISIT: HCPCS | Mod: 25

## 2023-03-06 PROCEDURE — 31575 DIAGNOSTIC LARYNGOSCOPY: CPT | Performed by: PHYSICIAN ASSISTANT

## 2023-03-06 RX ORDER — FLUTICASONE PROPIONATE 50 MCG
2 SPRAY, SUSPENSION (ML) NASAL DAILY
Qty: 16 G | Refills: 11 | Status: SHIPPED | OUTPATIENT
Start: 2023-03-06

## 2023-03-06 RX ORDER — OMEPRAZOLE 40 MG/1
40 CAPSULE, DELAYED RELEASE ORAL DAILY
Qty: 90 CAPSULE | Refills: 1 | Status: SHIPPED | OUTPATIENT
Start: 2023-03-06

## 2023-03-06 ASSESSMENT — PAIN SCALES - GENERAL: PAINLEVEL: NO PAIN (0)

## 2023-03-06 NOTE — PATIENT INSTRUCTIONS
Increase water intake.   Follow reflux precautions.     Start magic mouthwash as directed for 7 days.   Start Prilosec 40 mg daily.   Follow up in 2-3 weeks.     Complete Neck CT.   Start Flonase 2 sprays to each nostril daily.     Thank you for allowing Melody Loco PA-C and our ENT team to participate in your care.  If your medications are too expensive, please give the nurse a call.  We can possibly change this medication.  If you have a scheduling or an appointment question please contact our Health Unit Coordinator at [phone], Ext. [phone].    ALL nursing questions or concerns can be directed to your ENT nurse at: [phone] [facility]      Adult lifestyle changes to prevent LPR reviewed     Avoid eating and drinking within two to three hours prior to bedtime   Do not drink alcohol   Eat small meals and slowly   Limit problem foods:    o Caffeine  o Carbonated drinks  o Chocolate  o Peppermint  o Tomato  o Citrus fruits  o Fatty and fried foods       Quit smoking   Wear loose clothing

## 2023-03-06 NOTE — LETTER
3/6/2023         RE: Vinny Hsu  [address]  [facility] [place], [place] [address]        Dear Colleague,    Thank you for referring your patient, Vinny Hsu, to the [facility]. Please see a copy of my visit note below.    Otolaryngology Consultation    Patient: Vinyn Hsu  : 1965    Patient presents with:  Consult: Self referral for lump in throat      HPI:  Vinny Hsu is a 57 year old male seen today for globus sensation.   Paul presents for concerns of throat issues , eustachian tube complaints.  Patient has experience symptoms since 2018.  He was noted to initially have eustachian tube dysfunction and reported that his symptoms did not improve.  He further developed in 2022 coughing and globus sensation.  He does report constant sensation of phlegm along his Larynex.  He reports intermittent dysphagia.      Patient denies sore throat or throat pain  Thick mucous along larynx.   Denies chronic otalgia.   Denies fevers, night sweats or weight loss.     +Apnea  +dysphagia  + globus sensation.   Denies dysphonia.   +Nasal congestion  Paul has post nasal drainage  Denies sinus concerns or allergy issues.       Water- one bottle  Caffeine- 1 large thermos  ETOH- Beer- daily 8/ daily.   Tobacco- Cigars.   2 cigs daily now, 2-3 over the last few years.   Reflux- None.     Decreased hearing.   Denies COM or otologic surgeries.   Denies otalgia, otorrhea  Denies worrisome tinnitus  Denies fluctuating hearing loss or tinnitus.   Denies vertigo or facial paraesthesia.       Current Outpatient Rx   Medication Sig Dispense Refill     atorvastatin (LIPITOR) 20 MG tablet Take 1 tablet (20 mg) by mouth daily 90 tablet 3     buPROPion (WELLBUTRIN XL) 150 MG 24 hr tablet Take 1 tablet (150 mg) by mouth daily 90 tablet 5     BusPIRone HCl 30 MG TABS Take 30 mg by mouth 2 times daily 180 tablet 3     capsaicin (ZOSTRIX) 0.025 % external cream Apply to the lateral left  elbow twice per day. (Patient not taking: Reported on 4/28/2021) 120 g 1     capsaicin (ZOSTRIX) 0.025 % external cream Apply 1 g topically 3 times daily as needed (ankle pain) (Patient not taking: Reported on 4/28/2021) 120 g 1     fluconazole (DIFLUCAN) 200 MG tablet Take 1 tablet (200 mg) by mouth daily (Patient not taking: Reported on 4/28/2021) 30 tablet 0     gabapentin (NEURONTIN) 300 MG capsule ONE CAP Q AM, TWO CAPS  capsule 3     Levothyroxine Sodium 88 MCG CAPS Take 88 mcg by mouth daily 90 capsule 3     sertraline (ZOLOFT) 100 MG tablet Take 2 tablets (200 mg) by mouth daily 60 tablet 0     tamsulosin (FLOMAX) 0.4 MG capsule TAKE 1 CAPSULE BY MOUTH DAILY 30 capsule 0     traZODone (DESYREL) 50 MG tablet Take 1 tablet (50 mg) by mouth At Bedtime MAY TAKE ADDITIONAL DOSE AS NEEDED 60 tablet 0       Allergies: Antihistamines, loratadine-type [piperidines]; Spiriva handihaler; and Keflet [cephalexin]     Past Medical History:   Diagnosis Date     Anxiety      BPH (benign prostatic hyperplasia)      Chronic mental illness      Chronic post-traumatic stress disorder (PTSD) 1973     COPD (chronic obstructive pulmonary disease) (H)      Depressive disorder      Hyperlipemia      Thyroid disease      Tourettes syndrome        Past Surgical History:   Procedure Laterality Date     DENTAL SURGERY Bilateral     ALL UPPER TEETH GONE     HC REPAIR HERNIA WITH MESH       LAPAROSCOPIC HERNIORRHAPHY INGUINAL Bilateral 7/13/2017    Procedure: LAPAROSCOPIC HERNIORRHAPHY INGUINAL;  LAPAROSCOPIC REPAIR BILATERAL INGUINAL HERNIA'S;  Surgeon: Bridget Hemphill MD;  Location: [facility] OR       ENT family history reviewed    Social History     Tobacco Use     Smoking status: Every Day     Types: Cigars     Start date: 1/1/1970     Smokeless tobacco: Never     Tobacco comments:     quit for 3 years   Substance Use Topics     Alcohol use: Yes     Comment: Beer daily     Drug use: Never       Review of Systems  ROS: 10 point ROS  "neg other than the symptoms noted above in the HPI     Physical Exam  BP (!) 150/70 (BP Location: Right arm, Patient Position: Sitting, Cuff Size: Adult Regular)   Pulse 77   Temp 97.9  F (36.6  C) (Tympanic)   Ht 1.753 m (5' 9\")   Wt 68 kg (150 lb)   SpO2 94%   BMI 22.15 kg/m    General - The patient is well nourished and well developed, and appears to have good nutritional status.  Alert and oriented to person and place, answers questions and cooperates with examination appropriately.   Head and Face - Normocephalic and atraumatic, with no gross asymmetry noted.  The facial nerve is intact, with strong symmetric movements.  Voice and Breathing - The patient was breathing comfortably without the use of accessory muscles. There was no wheezing, stridor, or stertor.  The patients voice was clear and strong, and had appropriate pitch and quality.  Ears -The external auditory canals are patent, the tympanic membranes are intact without effusion, retraction or mass.  Bony landmarks are intact.  Eyes - Extraocular movements intact, and the pupils were reactive to light.  Sclera were not icteric or injected, conjunctiva were pink and moist.  Mouth - Examination of the oral cavity showed pink, healthy oral mucosa. No lesions or ulcerations noted.  The tongue was mobile and midline. Upper plate- removed. Normal tissue. No ulceration of changes.     Throat - The walls of the oropharynx were smooth, pink, moist, symmetric, and had no lesions or ulcerations.  The tonsillar pillars and soft palate were symmetric.  The uvula was midline on elevation.    Neck - Normal midline excursion of the laryngotracheal complex during swallowing.  Full range of motion on passive movement.  Palpation of the occipital, submental, submandibular, internal jugular chain, and supraclavicular nodes did not demonstrate any abnormal lymph nodes or masses.  Palpation of the thyroid was soft and smooth, with no nodules or goiter appreciated.  The " trachea was mobile and midline.  Nose - External contour is symmetric, no gross deflection or scars.  Nasal mucosa is pink and moist with no abnormal mucus.      Flexible Endoscopy -    Attempts at mirror laryngoscopy were not possible due to gag reflex.  Therefore I proceeded with a fiberoptic examination.  First I sprayed both sides of the nose with a mixture of lidocaine and neosynephrine.  I then passed the scope through the nasal cavity.   The nasal cavity was unremarkable.  The nasopharynx was mucosally covered and symmetric.  Mild postnasal drainage throughout.  Bilateral eustachian tubes are edematous no obstruction noted.  The Eustachian tube openings were unobstructed.  Going further down I had a clear view of the base of tongue, which had normal appearing lingual tonsillar tissue.  The base of tongue was free of lesions, and the vallecula was open.  The epiglottis was smooth and mucosally covered.  The supraglottic larynx was then clearly visualized.  The vocal cords moved smoothly and symmetrically, they were slightly edematous.  Bilateral vocal cords with small changes along anterior commissure.  This appears bilaterally possibly slight clearing with swallowing.  Concern for leukoplakia.  I did note a significant amount of edema and redundant mucosa in the interarytenoid soft tissues and posterior surface of the larynx.  The pyriform sinuses were open, and the limited view of the postcricoid region did not show any erosive or mass lesions.            Impression and Plan- Vinny Hsu is a 57 year old male with:    ICD-10-CM    1. Globus sensation  R09.89 magic mouthwash (ENTER INGREDIENTS IN COMMENTS) suspension     omeprazole (PRILOSEC) 40 MG DR capsule     CT Soft Tissue Neck w/o & w Contrast      2. LPRD (laryngopharyngeal reflux disease)  K21.9 magic mouthwash (ENTER INGREDIENTS IN COMMENTS) suspension     omeprazole (PRILOSEC) 40 MG DR capsule     CT Soft Tissue Neck w/o & w Contrast      3.  Post-nasal drainage  R09.82 fluticasone (FLONASE) 50 MCG/ACT nasal spray      4. Uncomplicated alcohol dependence (H)  F10.20       5. Tobacco use  Z72.0           Discussed with Paul that he has significant edema and changes throughout his Larynex.  Recommended to increase water, follow reflux precautions.  Start Magic mouthwash as directed for the next 7 days.  Start Prilosec 40 mg daily.  Complete neck CT.    Follow-up in 2 to 3 weeks with myself or Dr. Son for repeat examination of his Larynx.  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  Briefly discussed with Paul that if this remains present he should consider MDL with biopsy.    We will start with the above and repeat examination.  Start Flonase 2 sprays each nostril daily.  Trial of NeilMed rinse irrigation.    Adult lifestyle changes to prevent LPR reviewed      Avoid eating and drinking within two to three hours prior to bedtime    Do not drink alcohol    Eat small meals and slowly    Limit problem foods:    o Caffeine  o Carbonated drinks  o Chocolate  o Peppermint  o Tomato  o Citrus fruits  o Fatty and fried foods      Lose weight    Quit smoking    Wear loose clothing    Melody Loco PA-C  ENT  [facility] [place]          Again, thank you for allowing me to participate in the care of your patient.        Sincerely,        Melody Loco PA-C

## 2023-03-06 NOTE — PROGRESS NOTES
Otolaryngology Consultation    Patient: Vinny Hsu  : 1965    Patient presents with:  Consult: Self referral for lump in throat      HPI:  Vinny Hsu is a 57 year old male seen today for globus sensation.   Paul presents for concerns of throat issues , eustachian tube complaints.  Patient has experience symptoms since 2018.  He was noted to initially have eustachian tube dysfunction and reported that his symptoms did not improve.  He further developed in 2022 coughing and globus sensation.  He does report constant sensation of phlegm along his Larynex.  He reports intermittent dysphagia.      Patient denies sore throat or throat pain  Thick mucous along larynx.   Denies chronic otalgia.   Denies fevers, night sweats or weight loss.     +Apnea  +dysphagia  + globus sensation.   Denies dysphonia.   +Nasal congestion  Paul has post nasal drainage  Denies sinus concerns or allergy issues.       Water- one bottle  Caffeine- 1 large thermos  ETOH- Beer- daily 8/ daily.   Tobacco- Cigars.   2 cigs daily now, 2-3 over the last few years.   Reflux- None.     Decreased hearing.   Denies COM or otologic surgeries.   Denies otalgia, otorrhea  Denies worrisome tinnitus  Denies fluctuating hearing loss or tinnitus.   Denies vertigo or facial paraesthesia.       Current Outpatient Rx   Medication Sig Dispense Refill     atorvastatin (LIPITOR) 20 MG tablet Take 1 tablet (20 mg) by mouth daily 90 tablet 3     buPROPion (WELLBUTRIN XL) 150 MG 24 hr tablet Take 1 tablet (150 mg) by mouth daily 90 tablet 5     BusPIRone HCl 30 MG TABS Take 30 mg by mouth 2 times daily 180 tablet 3     capsaicin (ZOSTRIX) 0.025 % external cream Apply to the lateral left elbow twice per day. (Patient not taking: Reported on 2021) 120 g 1     capsaicin (ZOSTRIX) 0.025 % external cream Apply 1 g topically 3 times daily as needed (ankle pain) (Patient not taking: Reported on 2021) 120 g 1     fluconazole (DIFLUCAN)  "200 MG tablet Take 1 tablet (200 mg) by mouth daily (Patient not taking: Reported on 4/28/2021) 30 tablet 0     gabapentin (NEURONTIN) 300 MG capsule ONE CAP Q AM, TWO CAPS  capsule 3     Levothyroxine Sodium 88 MCG CAPS Take 88 mcg by mouth daily 90 capsule 3     sertraline (ZOLOFT) 100 MG tablet Take 2 tablets (200 mg) by mouth daily 60 tablet 0     tamsulosin (FLOMAX) 0.4 MG capsule TAKE 1 CAPSULE BY MOUTH DAILY 30 capsule 0     traZODone (DESYREL) 50 MG tablet Take 1 tablet (50 mg) by mouth At Bedtime MAY TAKE ADDITIONAL DOSE AS NEEDED 60 tablet 0       Allergies: Antihistamines, loratadine-type [piperidines]; Spiriva handihaler; and Keflet [cephalexin]     Past Medical History:   Diagnosis Date     Anxiety      BPH (benign prostatic hyperplasia)      Chronic mental illness      Chronic post-traumatic stress disorder (PTSD) 1973     COPD (chronic obstructive pulmonary disease) (H)      Depressive disorder      Hyperlipemia      Thyroid disease      Tourettes syndrome        Past Surgical History:   Procedure Laterality Date     DENTAL SURGERY Bilateral     ALL UPPER TEETH GONE     HC REPAIR HERNIA WITH MESH       LAPAROSCOPIC HERNIORRHAPHY INGUINAL Bilateral 7/13/2017    Procedure: LAPAROSCOPIC HERNIORRHAPHY INGUINAL;  LAPAROSCOPIC REPAIR BILATERAL INGUINAL HERNIA'S;  Surgeon: Bridget Hemphill MD;  Location: [facility] OR       ENT family history reviewed    Social History     Tobacco Use     Smoking status: Every Day     Types: Cigars     Start date: 1/1/1970     Smokeless tobacco: Never     Tobacco comments:     quit for 3 years   Substance Use Topics     Alcohol use: Yes     Comment: Beer daily     Drug use: Never       Review of Systems  ROS: 10 point ROS neg other than the symptoms noted above in the HPI     Physical Exam  BP (!) 150/70 (BP Location: Right arm, Patient Position: Sitting, Cuff Size: Adult Regular)   Pulse 77   Temp 97.9  F (36.6  C) (Tympanic)   Ht 1.753 m (5' 9\")   Wt 68 kg (150 lb)   " SpO2 94%   BMI 22.15 kg/m    General - The patient is well nourished and well developed, and appears to have good nutritional status.  Alert and oriented to person and place, answers questions and cooperates with examination appropriately.   Head and Face - Normocephalic and atraumatic, with no gross asymmetry noted.  The facial nerve is intact, with strong symmetric movements.  Voice and Breathing - The patient was breathing comfortably without the use of accessory muscles. There was no wheezing, stridor, or stertor.  The patients voice was clear and strong, and had appropriate pitch and quality.  Ears -The external auditory canals are patent, the tympanic membranes are intact without effusion, retraction or mass.  Bony landmarks are intact.  Eyes - Extraocular movements intact, and the pupils were reactive to light.  Sclera were not icteric or injected, conjunctiva were pink and moist.  Mouth - Examination of the oral cavity showed pink, healthy oral mucosa. No lesions or ulcerations noted.  The tongue was mobile and midline. Upper plate- removed. Normal tissue. No ulceration of changes.     Throat - The walls of the oropharynx were smooth, pink, moist, symmetric, and had no lesions or ulcerations.  The tonsillar pillars and soft palate were symmetric.  The uvula was midline on elevation.    Neck - Normal midline excursion of the laryngotracheal complex during swallowing.  Full range of motion on passive movement.  Palpation of the occipital, submental, submandibular, internal jugular chain, and supraclavicular nodes did not demonstrate any abnormal lymph nodes or masses.  Palpation of the thyroid was soft and smooth, with no nodules or goiter appreciated.  The trachea was mobile and midline.  Nose - External contour is symmetric, no gross deflection or scars.  Nasal mucosa is pink and moist with no abnormal mucus.      Flexible Endoscopy -    Attempts at mirror laryngoscopy were not possible due to gag reflex.   Therefore I proceeded with a fiberoptic examination.  First I sprayed both sides of the nose with a mixture of lidocaine and neosynephrine.  I then passed the scope through the nasal cavity.   The nasal cavity was unremarkable.  The nasopharynx was mucosally covered and symmetric.  Mild postnasal drainage throughout.  Bilateral eustachian tubes are edematous no obstruction noted.  The Eustachian tube openings were unobstructed.  Going further down I had a clear view of the base of tongue, which had normal appearing lingual tonsillar tissue.  The base of tongue was free of lesions, and the vallecula was open.  The epiglottis was smooth and mucosally covered.  The supraglottic larynx was then clearly visualized.  The vocal cords moved smoothly and symmetrically, they were slightly edematous.  Bilateral vocal cords with small changes along anterior commissure.  This appears bilaterally possibly slight clearing with swallowing.  Concern for leukoplakia.  I did note a significant amount of edema and redundant mucosa in the interarytenoid soft tissues and posterior surface of the larynx.  The pyriform sinuses were open, and the limited view of the postcricoid region did not show any erosive or mass lesions.            Impression and Plan- Vinny Hsu is a 57 year old male with:    ICD-10-CM    1. Globus sensation  R09.89 magic mouthwash (ENTER INGREDIENTS IN COMMENTS) suspension     omeprazole (PRILOSEC) 40 MG DR capsule     CT Soft Tissue Neck w/o & w Contrast      2. LPRD (laryngopharyngeal reflux disease)  K21.9 magic mouthwash (ENTER INGREDIENTS IN COMMENTS) suspension     omeprazole (PRILOSEC) 40 MG DR capsule     CT Soft Tissue Neck w/o & w Contrast      3. Post-nasal drainage  R09.82 fluticasone (FLONASE) 50 MCG/ACT nasal spray      4. Uncomplicated alcohol dependence (H)  F10.20       5. Tobacco use  Z72.0           Discussed with Paul that he has significant edema and changes throughout his Larynex.   Recommended to increase water, follow reflux precautions.  Start Magic mouthwash as directed for the next 7 days.  Start Prilosec 40 mg daily.  Complete neck CT.    Follow-up in 2 to 3 weeks with myself or Dr. Son for repeat examination of his Larynx.  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  Briefly discussed with Paul that if this remains present he should consider MDL with biopsy.    We will start with the above and repeat examination.  Start Flonase 2 sprays each nostril daily.  Trial of NeilMed rinse irrigation.    Adult lifestyle changes to prevent LPR reviewed      Avoid eating and drinking within two to three hours prior to bedtime    Do not drink alcohol    Eat small meals and slowly    Limit problem foods:    o Caffeine  o Carbonated drinks  o Chocolate  o Peppermint  o Tomato  o Citrus fruits  o Fatty and fried foods      Lose weight    Quit smoking    Wear loose clothing    Melody Loco PA-C  ENT  [facility] [place] [place]

## 2023-05-11 ENCOUNTER — TELEPHONE (OUTPATIENT)
Dept: OTOLARYNGOLOGY | Facility: OTHER | Age: 58
End: 2023-05-11

## 2023-05-11 NOTE — TELEPHONE ENCOUNTER
A nurse from [facility] called and is wondering if you could discontinue the Omeprazole?  He has not been taking this for a couple of weeks.  Please advise.

## 2023-05-16 NOTE — TELEPHONE ENCOUNTER
Yes. He may discontinue. He should return for repeat scope. He was recommended to recheck in a few weeks and last seen on 3/6 TR

## 2023-05-16 NOTE — TELEPHONE ENCOUNTER
Jennifer was notified.  She states that patient is trying to get transportation set up so he can follow up.

## 2023-08-23 ENCOUNTER — HOSPITAL ENCOUNTER (OUTPATIENT)
Dept: CT IMAGING | Facility: HOSPITAL | Age: 58
Discharge: HOME | End: 2023-08-23
Attending: PHYSICIAN ASSISTANT | Admitting: PHYSICIAN ASSISTANT
Payer: COMMERCIAL

## 2023-08-23 DIAGNOSIS — R09.A2 GLOBUS SENSATION: ICD-10-CM

## 2023-08-23 DIAGNOSIS — K21.9 LPRD (LARYNGOPHARYNGEAL REFLUX DISEASE): ICD-10-CM

## 2023-08-23 PROCEDURE — 250N000011 HC RX IP 250 OP 636: Performed by: RADIOLOGY

## 2023-08-23 PROCEDURE — 70491 CT SOFT TISSUE NECK W/DYE: CPT

## 2023-08-23 RX ORDER — IOPAMIDOL 755 MG/ML
67 INJECTION, SOLUTION INTRAVASCULAR ONCE
Status: COMPLETED | OUTPATIENT
Start: 2023-08-23 | End: 2023-08-23

## 2023-08-23 RX ADMIN — IOPAMIDOL 67 ML: 755 INJECTION, SOLUTION INTRAVENOUS at 13:59

## 2023-08-29 ENCOUNTER — TELEPHONE (OUTPATIENT)
Dept: OTOLARYNGOLOGY | Facility: OTHER | Age: 58
End: 2023-08-29

## 2023-08-29 NOTE — TELEPHONE ENCOUNTER
----- Message from Melody Loco PA-C sent at 8/29/2023 12:41 PM CDT -----  He needs recheck in ENT, He was to have seen KF shortly after my last visit. Please arrange,there is noted thickening on imaging, we need to repeat scope. TR

## 2023-09-13 NOTE — PROGRESS NOTES
"Otolaryngology Progress Note          Vinny Hsu is a 57 year old male presents with  chronic cough, occasional solid dysphagia and difficulty opening his eustachian tubes.    He was noted to have laryngeal edema and concerns for laryngeal leukoplakia based on Melody's  623 flexible laryngoscopy.  He was started on Prilosec and Magic mouthwash after his last visit.  Tobacco cessation was discussed and a CT sinus was ordered.    He used prilosec 1-2 weeks, no improvement in cough so stopped  Did not  magic mouthwash           He is concerned about his eustachian tubes not opening up.  He formally describing able to yell and clear his ear is his ears feel constantly plugged.  [facility] and reports his last [facility] was years ago     He denies fluctuating hearing loss or bothersome tinnitus  Hx PTSD    He has had a chronic cough for years, cough has been worsening.  Slow weight loss over years, denies precipitous weight loss  Solid dysphagia  No dysphonia  No chronic otalgia  No fever, chills or night sweats      CT neck dated 8/23/2023 was negative for lymphadenopathy or maricruz endolaryngeal mass but there is irregular mucosa in the glottis and supraglottis.    CT neck dated 8/23/2023 shows a grossly clear tongue base the vallecula are clear the piriforms are obstructed bilaterally the glottis is without maricruz asymmetry or mass but there is excess tissue density at the level of the glottis.  No cervical and adenopathy no concerning thyroid mass the trachea is midline  Significant history of tobacco and alcohol abuse  ETOH- Beer- daily 8/ daily.   Tobacco- cigarillos 2/day currently  Formerly 1.5 ppd cigarettes x 20 years  States he smokes and drinks to control his ptsd      Physical Exam  BP (!) 156/70 (BP Location: Left arm, Patient Position: Sitting)   Pulse 80   Temp 98.2  F (36.8  C) (Tympanic)   Ht 1.753 m (5' 9\")   Wt 68 kg (150 lb)   SpO2 97%   BMI 22.15 kg/m    General - " The patient is well nourished and well developed, and appears to have good nutritional status.  Alert and oriented to person and place, interactive.  Head and Face - Normocephalic and atraumatic, with no gross asymmetry noted of the contour of the facial features.  The facial nerve is intact, with strong symmetric movements.  Neck-no palpable lymphadenopathy or thyroid mass.  Trachea is midline.  Eyes - Extraocular movements intact.   Ears- External auditory canals are patent, tympanic membranes are intact without effusion or worrisome retractions   Nose - Nasal mucosa is pink and moist with no abnormal mucus.  The septum was grossly midline and non-obstructive, turbinates of normal size and position.  No polyps, masses, or purulence noted on examination.  Mouth - Examination of the oral cavity shows pink, healthy, moist mucosa.  No lesions or ulceration noted.  Edentulous uppers palate intact to remaining lower teeth in poor condition.  The tongue is mobile and midline.  Throat - The walls of the oropharynx were smooth, pink, moist, symmetric, and had no lesions or ulcerations.  The tonsillar pillars and soft palate were symmetric.  The uvula was midline on elevation.  Tongue base is smooth to palpation      Attempts at mirror laryngoscopy were not possible due to gag reflex.  Therefore I proceeded with a fiberoptic examination after informed consent.  First I applied topical nasal lidocaine and neosynephrine.  I then passed the scope through the nasal cavity.     The nasopharynx was mucosally covered and symmetric.  The eustachian tube openings were unobstructed.  No nasopharyngeal edema eustachian tubes are clear.  Going further down I had a clear view of the base of tongue which had normal appearing lingual tonsillar tissue.  The base of tongue was free of lesions, and the vallecula was open.  The epiglottis was smooth and mucosally covered.  The supraglottic larynx was then clearly visualized.  The vocal cords  moved smoothly and symmetrically and were without mass or nodules.  Vocal cord mobility was normal bilaterally with phonation and respiration.  There is moderate arytenoid mucosal edema.  The pyriform sinuses were open and without maricruz mass or pooling of secretions upon valsalva, and the limited view of the postcricoid region did not show any lesions.  The patient tolerated the procedure well.    Impression/Plan  Vinny Hsu is a 57 year old male    ICD-10-CM    1. Chronic cough  R05.3       2. Dysphagia, unspecified type  R13.10       3. Tobacco abuse counseling  Z71.6       4. Supraglottic edema secondary to tobacco use and cough  J38.4       5. Dysfunction of both eustachian tubes  H69.83       6. Bilateral hearing loss, unspecified hearing loss type  H91.93             I told rest he is at high risk of head and neck cancer secondary to tobacco and alcohol abuse.  There is no evidence of head or neck cancer on exam today.  CT neck was un concerning    Tobacco cessation was strongly encouraged.  The associated risk of head and neck cancer was discussed.  Every year of cessation offers health benefits. This was discussed with the patient today and they voiced understanding.  Quit tools and a nicotine patch were offered.      Audiogram with Tatianna  Release former audiogram  Wear your HA    Follow-up with me depending on results, consider office myringotomy  Risks including membrane perforation and hearing loss were discussed    [facility]  Consider esophagram if solid dysphagia worsens, should have EGD with etoh and tobacco hx if not completed, [facility] to [facility] and will notify his primary.        Wilma Son D.O.  Otolaryngology/Head and Neck Surgery  Allergy

## 2023-09-14 ENCOUNTER — OFFICE VISIT (OUTPATIENT)
Dept: OTOLARYNGOLOGY | Facility: OTHER | Age: 58
End: 2023-09-14
Attending: OTOLARYNGOLOGY
Payer: MEDICARE

## 2023-09-14 VITALS
OXYGEN SATURATION: 97 % | BODY MASS INDEX: 22.22 KG/M2 | HEART RATE: 80 BPM | WEIGHT: 150 LBS | TEMPERATURE: 98.2 F | HEIGHT: 69 IN | SYSTOLIC BLOOD PRESSURE: 156 MMHG | DIASTOLIC BLOOD PRESSURE: 70 MMHG

## 2023-09-14 DIAGNOSIS — R05.3 CHRONIC COUGH: Primary | ICD-10-CM

## 2023-09-14 DIAGNOSIS — H69.93 DYSFUNCTION OF BOTH EUSTACHIAN TUBES: ICD-10-CM

## 2023-09-14 DIAGNOSIS — H91.93 BILATERAL HEARING LOSS, UNSPECIFIED HEARING LOSS TYPE: ICD-10-CM

## 2023-09-14 DIAGNOSIS — R13.10 DYSPHAGIA, UNSPECIFIED TYPE: ICD-10-CM

## 2023-09-14 DIAGNOSIS — J38.4 SUPRAGLOTTIC EDEMA: ICD-10-CM

## 2023-09-14 DIAGNOSIS — Z71.6 TOBACCO ABUSE COUNSELING: ICD-10-CM

## 2023-09-14 PROCEDURE — 31575 DIAGNOSTIC LARYNGOSCOPY: CPT | Performed by: OTOLARYNGOLOGY

## 2023-09-14 PROCEDURE — 99214 OFFICE O/P EST MOD 30 MIN: CPT | Mod: 25 | Performed by: OTOLARYNGOLOGY

## 2023-09-14 PROCEDURE — G0463 HOSPITAL OUTPT CLINIC VISIT: HCPCS | Mod: 25

## 2023-09-14 ASSESSMENT — PAIN SCALES - GENERAL: PAINLEVEL: NO PAIN (0)

## 2023-09-14 NOTE — PATIENT INSTRUCTIONS
Complete Audiogram. Follow up with Dr. Son based on results.     Thank you for allowing Dr. Son and our ENT team to participate in your care.  If your medications are too expensive, please give the nurse a call.  We can possibly change this medication.  If you have a scheduling or an appointment question please contact our Health Unit Coordinator at their direct line [phone].   ALL nursing questions or concerns can be directed to your ENT nurse, Fuentes, at: [phone]

## 2023-09-14 NOTE — LETTER
9/14/2023         RE: Vinny Hsu  [address]  [facility] [place] [address]        Dear Colleague,    Thank you for referring your patient, Vinny Hsu, to the [facility]. Please see a copy of my visit note below.    Otolaryngology Progress Note          Vinny Hsu is a 57 year old male presents with  chronic cough, occasional solid dysphagia and difficulty opening his eustachian tubes.    He was noted to have laryngeal edema and concerns for laryngeal leukoplakia based on Melody's  623 flexible laryngoscopy.  He was started on Prilosec and Magic mouthwash after his last visit.  Tobacco cessation was discussed and a CT sinus was ordered.    He used prilosec 1-2 weeks, no improvement in cough so stopped  Did not  magic mouthwash           He is concerned about his eustachian tubes not opening up.  He formally describing able to yell and clear his ear is his ears feel constantly plugged.  [facility] and reports his last audiogram was years ago     He denies fluctuating hearing loss or bothersome tinnitus  Hx PTSD    He has had a chronic cough for years, cough has been worsening.  Slow weight loss over years, denies precipitous weight loss  Solid dysphagia  No dysphonia  No chronic otalgia  No fever, chills or night sweats      CT neck dated 8/23/2023 was negative for lymphadenopathy or maricruz endolaryngeal mass but there is irregular mucosa in the glottis and supraglottis.    CT neck dated 8/23/2023 shows a grossly clear tongue base the vallecula are clear the piriforms are obstructed bilaterally the glottis is without maricruz asymmetry or mass but there is excess tissue density at the level of the glottis.  No cervical and adenopathy no concerning thyroid mass the trachea is midline  Significant history of tobacco and alcohol abuse  ETOH- Beer- daily 8/ daily.   Tobacco- cigarillos 2/day currently  Formerly 1.5 ppd cigarettes x 20 years  States he  "smokes and drinks to control his ptsd      Physical Exam  BP (!) 156/70 (BP Location: Left arm, Patient Position: Sitting)   Pulse 80   Temp 98.2  F (36.8  C) (Tympanic)   Ht 1.753 m (5' 9\")   Wt 68 kg (150 lb)   SpO2 97%   BMI 22.15 kg/m    General - The patient is well nourished and well developed, and appears to have good nutritional status.  Alert and oriented to person and place, interactive.  Head and Face - Normocephalic and atraumatic, with no gross asymmetry noted of the contour of the facial features.  The facial nerve is intact, with strong symmetric movements.  Neck-no palpable lymphadenopathy or thyroid mass.  Trachea is midline.  Eyes - Extraocular movements intact.   Ears- External auditory canals are patent, tympanic membranes are intact without effusion or worrisome retractions   Nose - Nasal mucosa is pink and moist with no abnormal mucus.  The septum was grossly midline and non-obstructive, turbinates of normal size and position.  No polyps, masses, or purulence noted on examination.  Mouth - Examination of the oral cavity shows pink, healthy, moist mucosa.  No lesions or ulceration noted.  Edentulous uppers palate intact to remaining lower teeth in poor condition.  The tongue is mobile and midline.  Throat - The walls of the oropharynx were smooth, pink, moist, symmetric, and had no lesions or ulcerations.  The tonsillar pillars and soft palate were symmetric.  The uvula was midline on elevation.  Tongue base is smooth to palpation      Attempts at mirror laryngoscopy were not possible due to gag reflex.  Therefore I proceeded with a fiberoptic examination after informed consent.  First I applied topical nasal lidocaine and neosynephrine.  I then passed the scope through the nasal cavity.     The nasopharynx was mucosally covered and symmetric.  The eustachian tube openings were unobstructed.  No nasopharyngeal edema eustachian tubes are clear.  Going further down I had a clear view of the " base of tongue which had normal appearing lingual tonsillar tissue.  The base of tongue was free of lesions, and the vallecula was open.  The epiglottis was smooth and mucosally covered.  The supraglottic larynx was then clearly visualized.  The vocal cords moved smoothly and symmetrically and were without mass or nodules.  Vocal cord mobility was normal bilaterally with phonation and respiration.  There is moderate arytenoid mucosal edema.  The pyriform sinuses were open and without maricruz mass or pooling of secretions upon valsalva, and the limited view of the postcricoid region did not show any lesions.  The patient tolerated the procedure well.    Impression/Plan  Vinny Hsu is a 57 year old male    ICD-10-CM    1. Chronic cough  R05.3       2. Dysphagia, unspecified type  R13.10       3. Tobacco abuse counseling  Z71.6       4. Supraglottic edema secondary to tobacco use and cough  J38.4       5. Dysfunction of both eustachian tubes  H69.83       6. Bilateral hearing loss, unspecified hearing loss type  H91.93             I told rest he is at high risk of head and neck cancer secondary to tobacco and alcohol abuse.  There is no evidence of head or neck cancer on exam today.  CT neck was un concerning    Tobacco cessation was strongly encouraged.  The associated risk of head and neck cancer was discussed.  Every year of cessation offers health benefits. This was discussed with the patient today and they voiced understanding.  Quit tools and a nicotine patch were offered.      Audiogram with Tatianna  Release former audiogram  Wear your HA    Follow-up with me depending on results, consider office myringotomy  Risks including membrane perforation and hearing loss were discussed    [facility]  Consider esophagram if solid dysphagia worsens, should have EGD with etoh and tobacco hx if not completed, [facility] and will notify his primary.        Wilma Son,  REAGAN.O.  Otolaryngology/Head and Neck Surgery  Allergy          Again, thank you for allowing me to participate in the care of your patient.        Sincerely,        Wilma Son MD

## 2023-09-15 ENCOUNTER — TELEPHONE (OUTPATIENT)
Dept: AUDIOLOGY | Facility: OTHER | Age: 58
End: 2023-09-15

## 2023-09-18 ENCOUNTER — TELEPHONE (OUTPATIENT)
Dept: AUDIOLOGY | Facility: OTHER | Age: 58
End: 2023-09-18

## 2023-09-18 NOTE — TELEPHONE ENCOUNTER
9/18-called patient to schedule audio per KF. No VM to leave a message to call us back    Melissa Martinez

## 2023-09-19 ENCOUNTER — OFFICE VISIT (OUTPATIENT)
Dept: AUDIOLOGY | Facility: OTHER | Age: 58
End: 2023-09-19
Attending: AUDIOLOGIST
Payer: MEDICARE

## 2023-09-19 DIAGNOSIS — H90.3 SENSORINEURAL HEARING LOSS (SNHL) OF BOTH EARS: Primary | ICD-10-CM

## 2023-09-19 DIAGNOSIS — H69.90 ETD (EUSTACHIAN TUBE DYSFUNCTION): ICD-10-CM

## 2023-09-19 DIAGNOSIS — H69.90 ETD (EUSTACHIAN TUBE DYSFUNCTION): Primary | ICD-10-CM

## 2023-09-19 PROCEDURE — 92550 TYMPANOMETRY & REFLEX THRESH: CPT | Performed by: AUDIOLOGIST

## 2023-09-19 PROCEDURE — 92557 COMPREHENSIVE HEARING TEST: CPT | Performed by: AUDIOLOGIST

## 2023-09-19 NOTE — PROGRESS NOTES
Audiology Evaluation Completed. Please refer SCANNED AUDIOGRAM and/or TYMPANOGRAM for BACKGROUND, RESULTS, RECOMMENDATIONS.      Tatianna CHAVEZ, [facility]  Audiologist #[id number]

## 2023-10-17 ENCOUNTER — OFFICE VISIT (OUTPATIENT)
Dept: OTOLARYNGOLOGY | Facility: OTHER | Age: 58
End: 2023-10-17
Attending: NURSE PRACTITIONER
Payer: MEDICARE

## 2023-10-17 VITALS
HEART RATE: 72 BPM | SYSTOLIC BLOOD PRESSURE: 108 MMHG | TEMPERATURE: 97.9 F | HEIGHT: 69 IN | OXYGEN SATURATION: 97 % | WEIGHT: 145 LBS | RESPIRATION RATE: 20 BRPM | BODY MASS INDEX: 21.48 KG/M2 | DIASTOLIC BLOOD PRESSURE: 60 MMHG

## 2023-10-17 DIAGNOSIS — H90.3 SENSORINEURAL HEARING LOSS, BILATERAL: Primary | ICD-10-CM

## 2023-10-17 DIAGNOSIS — H93.13 TINNITUS, BILATERAL: ICD-10-CM

## 2023-10-17 DIAGNOSIS — H93.8X3 EAR FULLNESS, BILATERAL: ICD-10-CM

## 2023-10-17 PROCEDURE — 92504 EAR MICROSCOPY EXAMINATION: CPT | Performed by: NURSE PRACTITIONER

## 2023-10-17 PROCEDURE — 99213 OFFICE O/P EST LOW 20 MIN: CPT | Mod: 25 | Performed by: NURSE PRACTITIONER

## 2023-10-17 ASSESSMENT — PAIN SCALES - GENERAL: PAINLEVEL: NO PAIN (0)

## 2023-10-17 NOTE — LETTER
10/17/2023         RE: Vinny Hsu  [address]  [facility] [place] [address]        Dear Colleague,    Thank you for referring your patient, Vinny Hsu, to the [facility]. Please see a copy of my visit note below.      Otolaryngology Note         Chief Complaint:     Patient presents with:  Follow Up: HEA            History of Present Illness:     Vinny Hsu is a 58 year old male seen today for hearing loss and feeling of plugged ears.      He feels like he has trouble opening his eustachian tubes with yawning or opening his jaw.     He [facility].  [facility].      Hearing is symmetric on both sides.  He has not had any acute changes in hearing recently  + tinnitus in both sides, symmetric.  Not overly bothersome  No flux hearing  No aural fullness  No otalgia or otorrhea    No concerns for recurrent ear infections  The onset was gradual.    No vertigo  No facial numbness or tingling.   No history of otological surgery  + noise exposure, concerts, sporting events,  noise exposure - cooling fans     He was last seen in ENT on 9/14/2023 by Dr. Son for concerns for chronic cough.  Flexible laryngoscopy completed at that exam showed no nasopharyngeal edema, eustachian tubes are clear.    Audiogram completed 9/19/23:   Tympanograms are Type A for both ears suggesting normal eardrum mobility.  Acoustic Reflex Thresholds at 1000 Hz are present for both ears.  Thresholds are normal sloping to moderate-severe sensorineural hearing loss both ears.  Speech reception thresholds are in good agreement with pure tone average.  Word discrimination scores are excellent at supra-thresholds level.         Medications:     Current Outpatient Rx   Medication Sig Dispense Refill     atorvastatin (LIPITOR) 20 MG tablet Take 1 tablet (20 mg) by mouth daily 90 tablet 3     buPROPion (WELLBUTRIN XL) 150 MG  24 hr tablet Take 1 tablet (150 mg) by mouth daily 90 tablet 5     BusPIRone HCl 30 MG TABS Take 30 mg by mouth 2 times daily 180 tablet 3     gabapentin (NEURONTIN) 300 MG capsule ONE CAP Q AM, TWO CAPS  capsule 3     Levothyroxine Sodium 88 MCG CAPS Take 88 mcg by mouth daily 90 capsule 3     sertraline (ZOLOFT) 100 MG tablet Take 2 tablets (200 mg) by mouth daily 60 tablet 0     tamsulosin (FLOMAX) 0.4 MG capsule TAKE 1 CAPSULE BY MOUTH DAILY 30 capsule 0     traZODone (DESYREL) 50 MG tablet Take 1 tablet (50 mg) by mouth At Bedtime MAY TAKE ADDITIONAL DOSE AS NEEDED 60 tablet 0     capsaicin (ZOSTRIX) 0.025 % external cream Apply to the lateral left elbow twice per day. (Patient not taking: Reported on 4/28/2021) 120 g 1     capsaicin (ZOSTRIX) 0.025 % external cream Apply 1 g topically 3 times daily as needed (ankle pain) (Patient not taking: Reported on 4/28/2021) 120 g 1     fluconazole (DIFLUCAN) 200 MG tablet Take 1 tablet (200 mg) by mouth daily (Patient not taking: Reported on 4/28/2021) 30 tablet 0     fluticasone (FLONASE) 50 MCG/ACT nasal spray Spray 2 sprays into both nostrils daily (Patient not taking: Reported on 9/14/2023) 16 g 11     magic mouthwash (ENTER INGREDIENTS IN COMMENTS) suspension Take 10 mLs by mouth every 4 hours as needed (throat irritation) (Patient not taking: Reported on 9/14/2023) 240 mL 1     omeprazole (PRILOSEC) 40 MG DR capsule Take 1 capsule (40 mg) by mouth daily (Patient not taking: Reported on 9/14/2023) 90 capsule 1            Allergies:     Allergies: Antihistamines, loratadine-type [antihistamines, loratadine-type]; Tiotropium bromide monohydrate; and Keflet [cephalexin]          Past Medical History:     Past Medical History:   Diagnosis Date     Anxiety      BPH (benign prostatic hyperplasia)      Chronic mental illness      Chronic post-traumatic stress disorder (PTSD) 1973     COPD (chronic obstructive pulmonary disease) ([facility])      Depressive disorder       "Hyperlipemia      Thyroid disease      Tourettes syndrome             Past Surgical History:     Past Surgical History:   Procedure Laterality Date     DENTAL SURGERY Bilateral     ALL UPPER TEETH GONE     HC REPAIR HERNIA WITH MESH       LAPAROSCOPIC HERNIORRHAPHY INGUINAL Bilateral 7/13/2017    Procedure: LAPAROSCOPIC HERNIORRHAPHY INGUINAL;  LAPAROSCOPIC REPAIR BILATERAL INGUINAL HERNIA'S;  Surgeon: Bridget Hemphill MD;  Location: [facility] OR            Social History:     Social History     Tobacco Use     Smoking status: Every Day     Types: Cigars     Start date: 1/1/1970     Smokeless tobacco: Never     Tobacco comments:     quit for 3 years   Substance Use Topics     Alcohol use: Yes     Comment: Beer daily     Drug use: Never            Review of Systems:     ROS: See HPI         Physical Exam:     /60 (BP Location: Right arm, Patient Position: Sitting, Cuff Size: Adult Regular)   Pulse 72   Temp 97.9  F (36.6  C) (Tympanic)   Resp 20   Ht 1.753 m (5' 9\")   Wt 65.8 kg (145 lb)   SpO2 97%   BMI 21.41 kg/m      General - The patient is well nourished and well developed, and appears to have good nutritional status.  Alert and oriented to person and place, answers questions and cooperates with examination appropriately.   Head and Face - Normocephalic and atraumatic, with no gross asymmetry noted.  The facial nerve is intact, with strong symmetric movements.  He has frequent facial tics, jutting his jaw forward.  States he does this to \"clear his eustachian tubes\"  Voice and Breathing - The patient was breathing comfortably without the use of accessory muscles. There was no wheezing, stridor. The patients voice was clear and strong, and had appropriate pitch and quality.  Ears - External ear normal.  The ears were examined under binocular microscopy and with otoscope.  Minimal cerumen debris removed from bilateral canals.  The right is intact is intact without effusion or retraction.  There is good " movement with Valsalva.  Left tympanic membrane is intact without effusion or retraction.  There is good movement with Valsalva.  Bony landmarks bilaterally appear normal.    The eyes - Extraocular movements intact, sclera were not icteric or injected, conjunctiva were pink and moist.  Mouth - Examination of the oral cavity showed pink, healthy oral mucosa.  Upper palate is edentulous, minimal remaining teeth on the lower and poor condition. No lesions or ulcerations noted. The tongue was mobile and midline.   Throat - The walls of the oropharynx were smooth, pink, moist, symmetric, and had no lesions or ulcerations.  The tonsillar pillars and soft palate were symmetric. The uvula was midline on elevation.    Neck - Normal midline excursion of the laryngotracheal complex during swallowing.  Full range of motion on passive movement.  Palpation of the occipital, submental, submandibular, internal jugular chain, and supraclavicular nodes did not demonstrate any abnormal lymph nodes or masses.  Palpation of the thyroid was soft and smooth, with no nodules or goiter appreciated.  The trachea was mobile and midline.  Nose - External contour is symmetric, no gross deflection or scars.  Nasal mucosa is pink and moist with no abnormal mucus.  The septum and turbinates were evaluated with nasal speculum, no polyps, masses, or purulence noted on examination.         Assessment and Plan:       ICD-10-CM    1. Sensorineural hearing loss, bilateral  H90.3       2. Tinnitus, bilateral  H93.13       3. Ear fullness, bilateral  H93.8X3         Advised Vinny today that both ears appear healthy, he has good movement of the tympanic membranes with Valsalva.  Bilateral type a tympanograms noted.  Hearing loss is symmetric.  No concerning findings on audiogram.  Previous flexible laryngoscopy showed bilateral eustachian tubes patent.    Can start Flonase and if no improvement follow-up with Dr. Son for Melody for possible  myringotomy, if he has improvement with myringotomy may consider tube placement in the future.    Yolanda SCRUGGS  [facility] ENT      Again, thank you for allowing me to participate in the care of your patient.        Sincerely,        Yolanda Jensen NP

## 2023-10-17 NOTE — PATIENT INSTRUCTIONS
Thank you for allowing Yolanda Camila and our ENT team to participate in your care.  If your medications are too expensive, please give the nurse a call.  We can possibly change this medication.  If you have a scheduling or an appointment question please contact our Health Unit Coordinator at their direct line [phone].   ALL nursing questions or concerns can be directed to your ENT nurse at: [phone] - Luu     Follow up with Melody Loco or Dr. Son for a myringotomy and possible tubes if no improvement     Start Flonase, take as directed

## 2023-10-17 NOTE — PROGRESS NOTES
Otolaryngology Note         Chief Complaint:     Patient presents with:  Follow Up: HEA            History of Present Illness:     Vinny Hsu is a 58 year old male seen today for hearing loss and feeling of plugged ears.      He feels like he has trouble opening his eustachian tubes with yawning or opening his jaw.     [facility]  [facility].      Hearing is symmetric on both sides.  He has not had any acute changes in hearing recently  + tinnitus in both sides, symmetric.  Not overly bothersome  No flux hearing  No aural fullness  No otalgia or otorrhea    No concerns for recurrent ear infections  The onset was gradual.    No vertigo  No facial numbness or tingling.   No history of otological surgery  + noise exposure, concerts, sporting events,  noise exposure - cooling fans     He was last seen in ENT on 9/14/2023 by Dr. Son for concerns for chronic cough.  Flexible laryngoscopy completed at that exam showed no nasopharyngeal edema, eustachian tubes are clear.    Audiogram completed 9/19/23:   Tympanograms are Type A for both ears suggesting normal eardrum mobility.  Acoustic Reflex Thresholds at 1000 Hz are present for both ears.  Thresholds are normal sloping to moderate-severe sensorineural hearing loss both ears.  Speech reception thresholds are in good agreement with pure tone average.  Word discrimination scores are excellent at supra-thresholds level.         Medications:     Current Outpatient Rx   Medication Sig Dispense Refill    atorvastatin (LIPITOR) 20 MG tablet Take 1 tablet (20 mg) by mouth daily 90 tablet 3    buPROPion (WELLBUTRIN XL) 150 MG 24 hr tablet Take 1 tablet (150 mg) by mouth daily 90 tablet 5    BusPIRone HCl 30 MG TABS Take 30 mg by mouth 2 times daily 180 tablet 3    gabapentin (NEURONTIN) 300 MG capsule ONE CAP Q AM, TWO CAPS  capsule 3    Levothyroxine Sodium 88 MCG CAPS  Take 88 mcg by mouth daily 90 capsule 3    sertraline (ZOLOFT) 100 MG tablet Take 2 tablets (200 mg) by mouth daily 60 tablet 0    tamsulosin (FLOMAX) 0.4 MG capsule TAKE 1 CAPSULE BY MOUTH DAILY 30 capsule 0    traZODone (DESYREL) 50 MG tablet Take 1 tablet (50 mg) by mouth At Bedtime MAY TAKE ADDITIONAL DOSE AS NEEDED 60 tablet 0    capsaicin (ZOSTRIX) 0.025 % external cream Apply to the lateral left elbow twice per day. (Patient not taking: Reported on 4/28/2021) 120 g 1    capsaicin (ZOSTRIX) 0.025 % external cream Apply 1 g topically 3 times daily as needed (ankle pain) (Patient not taking: Reported on 4/28/2021) 120 g 1    fluconazole (DIFLUCAN) 200 MG tablet Take 1 tablet (200 mg) by mouth daily (Patient not taking: Reported on 4/28/2021) 30 tablet 0    fluticasone (FLONASE) 50 MCG/ACT nasal spray Spray 2 sprays into both nostrils daily (Patient not taking: Reported on 9/14/2023) 16 g 11    magic mouthwash (ENTER INGREDIENTS IN COMMENTS) suspension Take 10 mLs by mouth every 4 hours as needed (throat irritation) (Patient not taking: Reported on 9/14/2023) 240 mL 1    omeprazole (PRILOSEC) 40 MG DR capsule Take 1 capsule (40 mg) by mouth daily (Patient not taking: Reported on 9/14/2023) 90 capsule 1            Allergies:     Allergies: Antihistamines, loratadine-type [antihistamines, loratadine-type]; Tiotropium bromide monohydrate; and Keflet [cephalexin]          Past Medical History:     Past Medical History:   Diagnosis Date    Anxiety     BPH (benign prostatic hyperplasia)     Chronic mental illness     Chronic post-traumatic stress disorder (PTSD) 1973    COPD (chronic obstructive pulmonary disease) ([facility])     Depressive disorder     Hyperlipemia     Thyroid disease     Tourettes syndrome             Past Surgical History:     Past Surgical History:   Procedure Laterality Date    DENTAL SURGERY Bilateral     ALL UPPER TEETH GONE    HC REPAIR HERNIA WITH MESH      LAPAROSCOPIC HERNIORRHAPHY INGUINAL  "Bilateral 7/13/2017    Procedure: LAPAROSCOPIC HERNIORRHAPHY INGUINAL;  LAPAROSCOPIC REPAIR BILATERAL INGUINAL HERNIA'S;  Surgeon: Bridget Hemphill MD;  Location: [facility] OR            Social History:     Social History     Tobacco Use    Smoking status: Every Day     Types: Cigars     Start date: 1/1/1970    Smokeless tobacco: Never    Tobacco comments:     quit for 3 years   Substance Use Topics    Alcohol use: Yes     Comment: Beer daily    Drug use: Never            Review of Systems:     ROS: See HPI         Physical Exam:     /60 (BP Location: Right arm, Patient Position: Sitting, Cuff Size: Adult Regular)   Pulse 72   Temp 97.9  F (36.6  C) (Tympanic)   Resp 20   Ht 1.753 m (5' 9\")   Wt 65.8 kg (145 lb)   SpO2 97%   BMI 21.41 kg/m      General - The patient is well nourished and well developed, and appears to have good nutritional status.  Alert and oriented to person and place, answers questions and cooperates with examination appropriately.   Head and Face - Normocephalic and atraumatic, with no gross asymmetry noted.  The facial nerve is intact, with strong symmetric movements.  He has frequent facial tics, jutting his jaw forward.  States he does this to \"clear his eustachian tubes\"  Voice and Breathing - The patient was breathing comfortably without the use of accessory muscles. There was no wheezing, stridor. The patients voice was clear and strong, and had appropriate pitch and quality.  Ears - External ear normal.  The ears were examined under binocular microscopy and with otoscope.  Minimal cerumen debris removed from bilateral canals.  The right is intact is intact without effusion or retraction.  There is good movement with Valsalva.  Left tympanic membrane is intact without effusion or retraction.  There is good movement with Valsalva.  Bony landmarks bilaterally appear normal.    The eyes - Extraocular movements intact, sclera were not icteric or injected, conjunctiva were pink and " moist.  Mouth - Examination of the oral cavity showed pink, healthy oral mucosa.  Upper palate is edentulous, minimal remaining teeth on the lower and poor condition. No lesions or ulcerations noted. The tongue was mobile and midline.   Throat - The walls of the oropharynx were smooth, pink, moist, symmetric, and had no lesions or ulcerations.  The tonsillar pillars and soft palate were symmetric. The uvula was midline on elevation.    Neck - Normal midline excursion of the laryngotracheal complex during swallowing.  Full range of motion on passive movement.  Palpation of the occipital, submental, submandibular, internal jugular chain, and supraclavicular nodes did not demonstrate any abnormal lymph nodes or masses.  Palpation of the thyroid was soft and smooth, with no nodules or goiter appreciated.  The trachea was mobile and midline.  Nose - External contour is symmetric, no gross deflection or scars.  Nasal mucosa is pink and moist with no abnormal mucus.  The septum and turbinates were evaluated with nasal speculum, no polyps, masses, or purulence noted on examination.         Assessment and Plan:       ICD-10-CM    1. Sensorineural hearing loss, bilateral  H90.3       2. Tinnitus, bilateral  H93.13       3. Ear fullness, bilateral  H93.8X3         Advised Vinny today that both ears appear healthy, he has good movement of the tympanic membranes with Valsalva.  Bilateral type a tympanograms noted.  Hearing loss is symmetric.  No concerning findings on audiogram.  Previous flexible laryngoscopy showed bilateral eustachian tubes patent.    Can start Flonase and if no improvement follow-up with Dr. Son for Melody for possible myringotomy, if he has improvement with myringotomy may consider tube placement in the future.    Yolanda Jensen NP-C  [facility]

## 2024-11-29 ENCOUNTER — TRANSFERRED RECORDS (OUTPATIENT)
Dept: HEALTH INFORMATION MANAGEMENT | Facility: CLINIC | Age: 59
End: 2024-11-29

## 2024-12-02 ENCOUNTER — TRANSCRIBE ORDERS (OUTPATIENT)
Dept: OTHER | Age: 59
End: 2024-12-02

## 2024-12-02 DIAGNOSIS — R19.5 OTHER FECAL ABNORMALITIES: Primary | ICD-10-CM

## 2024-12-04 ENCOUNTER — TELEPHONE (OUTPATIENT)
Dept: GASTROENTEROLOGY | Facility: CLINIC | Age: 59
End: 2024-12-04

## 2024-12-04 NOTE — TELEPHONE ENCOUNTER
"Endoscopy Scheduling Screen    Have you had any respiratory illness or flu-like symptoms in the last 10 days?  No    What is your communication preference for Instructions and/or Bowel Prep?   Mail/USPS    What insurance is in the chart?  Other:  [facility]    Ordering/Referring Provider: MARTA CHAUDHARI   (If ordering provider performs procedure, schedule with ordering provider unless otherwise instructed. )    BMI: Estimated body mass index is 21.41 kg/m  as calculated from the following:    Height as of 10/17/23: 1.753 m (5' 9\").    Weight as of 10/17/23: 65.8 kg (145 lb).     Sedation Ordered  MAC/deep sedation.   BMI<= 45 45 < BMI <= 48 48 < BMI < = 50  BMI > 50   No Restrictions No [facility]  No [facility]  [facility] with exceptions Hospital Only OR Only       Do you have a history of malignant hyperthermia?  No    (Females) Are you currently pregnant?   No     Have you been diagnosed or told you have pulmonary hypertension?   No    Do you have an LVAD?  No    Have you been told you have moderate to severe sleep apnea?  Yes. Do you use a CPAP? Yes Where is the patient located?. (RN Review required for scheduling unless scheduling in Hospital.)     Have you been told you have COPD, asthma, or any other lung disease?  No    Do you have any heart conditions?  No     Have you ever had or are you waiting for an organ transplant?  No. Continue scheduling, no site restrictions.    Have you had a stroke or transient ischemic attack (TIA aka \"mini stroke\" in the last 6 months?   No    Have you been diagnosed with or been told you have cirrhosis of the liver?   No.    Are you currently on dialysis?   No    Do you need assistance transferring?   No    BMI: Estimated body mass index is 21.41 kg/m  as calculated from the following:    Height as of 10/17/23: 1.753 m (5' 9\").    Weight as of 10/17/23: 65.8 kg (145 lb).     Is patients BMI > 40 and scheduling location [facility]?  No    Do you take an injectable or oral medication for weight loss " or diabetes (excluding insulin)?  No    Do you take the medication Naltrexone?  No    Do you take blood thinners?  No       Prep   Are you currently on dialysis or do you have chronic kidney disease?  No    Do you have a diagnosis of diabetes?  No    Do you have a diagnosis of cystic fibrosis (CF)?  No    On a regular basis do you go 3 -5 days between bowel movements?  No    BMI > 40?  No    Preferred Pharmacy:    [facility] [place] - [address]  [address] [address]  Phone: [phone] Fax: [phone]    Final Scheduling Details     Procedure scheduled  Colonoscopy

## 2025-01-15 ENCOUNTER — PREP FOR PROCEDURE (OUTPATIENT)
Dept: SURGERY | Facility: OTHER | Age: 60
End: 2025-01-15

## 2025-01-15 DIAGNOSIS — R13.10 ODYNOPHAGIA: Primary | ICD-10-CM

## 2025-01-15 DIAGNOSIS — R19.5 POSITIVE FIT (FECAL IMMUNOCHEMICAL TEST): ICD-10-CM

## 2025-01-15 PROBLEM — F41.9 ANXIETY: Status: ACTIVE | Noted: 2025-01-15

## 2025-01-15 PROBLEM — N13.8 BENIGN PROSTATIC HYPERPLASIA WITH URINARY OBSTRUCTION: Status: ACTIVE | Noted: 2025-01-15

## 2025-01-15 PROBLEM — K62.5 HEMORRHAGE OF ANUS AND RECTUM: Status: ACTIVE | Noted: 2025-01-15

## 2025-01-15 PROBLEM — N40.1 BENIGN PROSTATIC HYPERPLASIA WITH URINARY OBSTRUCTION: Status: ACTIVE | Noted: 2025-01-15

## 2025-01-15 PROBLEM — R45.851 FEELING SUICIDAL: Status: ACTIVE | Noted: 2025-01-15

## 2025-01-15 PROBLEM — F33.1 RECURRENT MAJOR DEPRESSIVE EPISODES, MODERATE (H): Status: ACTIVE | Noted: 2025-01-15

## 2025-01-15 PROBLEM — F10.20 ALCOHOL DEPENDENCE (H): Status: ACTIVE | Noted: 2018-12-12

## 2025-01-15 PROBLEM — F95.9 TIC DISORDER: Status: ACTIVE | Noted: 2025-01-15

## 2025-01-15 PROBLEM — E55.9 VITAMIN D DEFICIENCY: Status: ACTIVE | Noted: 2025-01-15

## 2025-01-15 PROBLEM — G47.30 SLEEP APNEA: Status: ACTIVE | Noted: 2025-01-15

## 2025-01-15 PROBLEM — F12.10 CANNABIS ABUSE: Status: ACTIVE | Noted: 2025-01-15

## 2025-01-15 PROBLEM — K92.2 GASTROINTESTINAL HEMORRHAGE, UNSPECIFIED: Status: ACTIVE | Noted: 2025-01-15

## 2025-01-15 PROBLEM — H91.90 HEARING LOSS: Status: ACTIVE | Noted: 2025-01-15

## 2025-01-15 PROBLEM — F33.41 MAJOR DEPRESSIVE DISORDER, RECURRENT, IN PARTIAL REMISSION: Status: ACTIVE | Noted: 2025-01-15

## 2025-01-15 PROBLEM — J43.9 PULMONARY EMPHYSEMA (H): Status: ACTIVE | Noted: 2025-01-15

## 2025-01-15 PROBLEM — J44.9 CHRONIC OBSTRUCTIVE LUNG DISEASE (H): Status: ACTIVE | Noted: 2025-01-15

## 2025-02-14 ENCOUNTER — HOSPITAL ENCOUNTER (OUTPATIENT)
Facility: HOSPITAL | Age: 60
Discharge: HOME | End: 2025-02-14
Attending: SURGERY | Admitting: SURGERY
Payer: COMMERCIAL

## 2025-02-14 VITALS
BODY MASS INDEX: 23.08 KG/M2 | RESPIRATION RATE: 16 BRPM | HEIGHT: 69 IN | DIASTOLIC BLOOD PRESSURE: 82 MMHG | SYSTOLIC BLOOD PRESSURE: 145 MMHG | OXYGEN SATURATION: 100 % | WEIGHT: 155.8 LBS | TEMPERATURE: 97.9 F | HEART RATE: 65 BPM

## 2025-02-14 DIAGNOSIS — K29.00 ACUTE GASTRITIS, PRESENCE OF BLEEDING UNSPECIFIED, UNSPECIFIED GASTRITIS TYPE: Primary | ICD-10-CM

## 2025-02-14 PROCEDURE — 45385 COLONOSCOPY W/LESION REMOVAL: CPT | Mod: PT | Performed by: SURGERY

## 2025-02-14 PROCEDURE — 88305 TISSUE EXAM BY PATHOLOGIST: CPT | Mod: TC | Performed by: SURGERY

## 2025-02-14 PROCEDURE — 360N000075 HC SURGERY LEVEL 2, PER MIN: Performed by: SURGERY

## 2025-02-14 PROCEDURE — 45380 COLONOSCOPY AND BIOPSY: CPT | Mod: PT | Performed by: SURGERY

## 2025-02-14 PROCEDURE — 43239 EGD BIOPSY SINGLE/MULTIPLE: CPT | Performed by: SURGERY

## 2025-02-14 PROCEDURE — 94640 AIRWAY INHALATION TREATMENT: CPT

## 2025-02-14 PROCEDURE — 999N000141 HC STATISTIC PRE-PROCEDURE NURSING ASSESSMENT: Performed by: SURGERY

## 2025-02-14 PROCEDURE — 88305 TISSUE EXAM BY PATHOLOGIST: CPT | Mod: 26 | Performed by: PATHOLOGY

## 2025-02-14 PROCEDURE — 710N000012 HC RECOVERY PHASE 2, PER MINUTE: Performed by: SURGERY

## 2025-02-14 PROCEDURE — 272N000001 HC OR GENERAL SUPPLY STERILE: Performed by: SURGERY

## 2025-02-14 PROCEDURE — 370N000017 HC ANESTHESIA TECHNICAL FEE, PER MIN: Performed by: SURGERY

## 2025-02-14 PROCEDURE — 250N000009 HC RX 250: Performed by: SURGERY

## 2025-02-14 PROCEDURE — 999N000157 HC STATISTIC RCP TIME EA 10 MIN

## 2025-02-14 RX ORDER — LIDOCAINE 40 MG/G
CREAM TOPICAL
Status: DISCONTINUED | OUTPATIENT
Start: 2025-02-14 | End: 2025-02-14 | Stop reason: HOSPADM

## 2025-02-14 RX ORDER — FLUMAZENIL 0.1 MG/ML
0.2 INJECTION, SOLUTION INTRAVENOUS
Status: DISCONTINUED | OUTPATIENT
Start: 2025-02-14 | End: 2025-02-14 | Stop reason: HOSPADM

## 2025-02-14 RX ORDER — ONDANSETRON 4 MG/1
4 TABLET, ORALLY DISINTEGRATING ORAL EVERY 30 MIN PRN
Status: DISCONTINUED | OUTPATIENT
Start: 2025-02-14 | End: 2025-02-14 | Stop reason: HOSPADM

## 2025-02-14 RX ORDER — NALOXONE HYDROCHLORIDE 0.4 MG/ML
0.4 INJECTION, SOLUTION INTRAMUSCULAR; INTRAVENOUS; SUBCUTANEOUS
Status: DISCONTINUED | OUTPATIENT
Start: 2025-02-14 | End: 2025-02-14 | Stop reason: HOSPADM

## 2025-02-14 RX ORDER — ALBUTEROL SULFATE 0.83 MG/ML
SOLUTION RESPIRATORY (INHALATION)
Status: COMPLETED
Start: 2025-02-14 | End: 2025-02-14

## 2025-02-14 RX ORDER — ONDANSETRON 2 MG/ML
4 INJECTION INTRAMUSCULAR; INTRAVENOUS EVERY 30 MIN PRN
Status: DISCONTINUED | OUTPATIENT
Start: 2025-02-14 | End: 2025-02-14 | Stop reason: HOSPADM

## 2025-02-14 RX ORDER — OMEPRAZOLE 40 MG/1
40 CAPSULE, DELAYED RELEASE ORAL DAILY
Qty: 30 CAPSULE | Refills: 2 | Status: SHIPPED | OUTPATIENT
Start: 2025-02-14

## 2025-02-14 RX ORDER — NALOXONE HYDROCHLORIDE 0.4 MG/ML
0.1 INJECTION, SOLUTION INTRAMUSCULAR; INTRAVENOUS; SUBCUTANEOUS
Status: DISCONTINUED | OUTPATIENT
Start: 2025-02-14 | End: 2025-02-14 | Stop reason: HOSPADM

## 2025-02-14 RX ORDER — SODIUM CHLORIDE, SODIUM LACTATE, POTASSIUM CHLORIDE, CALCIUM CHLORIDE 600; 310; 30; 20 MG/100ML; MG/100ML; MG/100ML; MG/100ML
INJECTION, SOLUTION INTRAVENOUS CONTINUOUS
Status: DISCONTINUED | OUTPATIENT
Start: 2025-02-14 | End: 2025-02-14 | Stop reason: HOSPADM

## 2025-02-14 RX ORDER — HYDRALAZINE HYDROCHLORIDE 20 MG/ML
2.5-5 INJECTION INTRAMUSCULAR; INTRAVENOUS EVERY 10 MIN PRN
Status: DISCONTINUED | OUTPATIENT
Start: 2025-02-14 | End: 2025-02-14 | Stop reason: HOSPADM

## 2025-02-14 RX ORDER — NALOXONE HYDROCHLORIDE 0.4 MG/ML
0.2 INJECTION, SOLUTION INTRAMUSCULAR; INTRAVENOUS; SUBCUTANEOUS
Status: DISCONTINUED | OUTPATIENT
Start: 2025-02-14 | End: 2025-02-14 | Stop reason: HOSPADM

## 2025-02-14 RX ORDER — DEXAMETHASONE SODIUM PHOSPHATE 10 MG/ML
4 INJECTION, SOLUTION INTRAMUSCULAR; INTRAVENOUS
Status: DISCONTINUED | OUTPATIENT
Start: 2025-02-14 | End: 2025-02-14 | Stop reason: HOSPADM

## 2025-02-14 RX ORDER — LABETALOL HYDROCHLORIDE 5 MG/ML
10 INJECTION, SOLUTION INTRAVENOUS
Status: DISCONTINUED | OUTPATIENT
Start: 2025-02-14 | End: 2025-02-14 | Stop reason: HOSPADM

## 2025-02-14 RX ORDER — FENTANYL CITRATE 50 UG/ML
50 INJECTION, SOLUTION INTRAMUSCULAR; INTRAVENOUS EVERY 5 MIN PRN
Status: DISCONTINUED | OUTPATIENT
Start: 2025-02-14 | End: 2025-02-14 | Stop reason: HOSPADM

## 2025-02-14 RX ORDER — ALBUTEROL SULFATE 5 MG/ML
2.5 SOLUTION RESPIRATORY (INHALATION) ONCE
Status: DISCONTINUED | OUTPATIENT
Start: 2025-02-14 | End: 2025-02-14 | Stop reason: HOSPADM

## 2025-02-14 RX ADMIN — ALBUTEROL SULFATE 2.5 MG: 2.5 SOLUTION RESPIRATORY (INHALATION) at 11:47

## 2025-02-14 ASSESSMENT — ACTIVITIES OF DAILY LIVING (ADL)
ADLS_ACUITY_SCORE: 41

## 2025-02-14 NOTE — DISCHARGE INSTRUCTIONS
"You had biopsies and nine colon polyps removed today.  Dr. Francisco's office will contact you with the pathology results when they become available.   I sent in a prescription for omeprazole, please take in the morning 30-60 minutes before you eat the first time each day.    Known reflux triggers:  Tobacco  Caffeine (especially coffee, chocolate)  Tomato based foods  Fatty foods (especially fast foods)  Citrus  Peppermint  Alcohol  NSAIDS (aspirin, ibuprofen)  Carbonated beverages      Things to try:  Elevate the head of your bed four inches  Maintain a healthy weight  Refrain from eating within three hours of lying down  Take your acid reducing medication 30\"-60\" before eating the first time each day     After Anesthesia (Sleep Medicine)  What should I do after anesthesia?  You should rest and relax for the next 24 hours. Avoid risky or difficult (strenuous) activity. A responsible adult should stay with you overnight.  Don't drive or use any heavy equipment for 24 hours. Even if you feel normal, your reactions may be affected by the sleep medicine given to you.  Don't drink alcohol or make any important decisions for 24 hours.  Slowly get back to your regular diet, as you feel able.  How should I expect to feel?  It's normal to feel dizzy, light-headed, or faint for up to a full day after anesthesia or while taking pain medicine. If this happens:   Sit down for a few minutes before standing.  Have someone help you when you get up to walk or use the bathroom.  If you have nausea (feel sick to your stomach) or vomit (throw up):   Drink clear liquids (such as apple juice, ginger ale, broth, or 7UP) until you feel better.  If you feel sick to your stomach, or you keep vomiting for 24 hours, please call the doctor.  What else should I know?  You might have a dry mouth, sore throat, muscle aches, or trouble sleeping. These should go away after 24 hours.  Please contact your doctor if you have any other symptoms that " concern you, such as fever, pain, bleeding, fluid drainage, swelling, or headache, or if it's been over 8 to 10 hours and you still aren't able to pee (urinate).  If you have a history of sleep apnea, it's very important to use your CPAP machine for the next 24 hours when you nap or sleep.   For informational purposes only. Not to replace the advice of your health care provider. Copyright   2023 [facility]. All rights reserved. Clinically reviewed by Mann Ramirez MD. TMS [id number] - REV 09/23.  Upper GI Endoscopy: What to Expect at Home  Your Recovery  You had an upper GI endoscopy. Your doctor used a thin, lighted tube that bends to look at the inside of your esophagus, your stomach, and the first part of the small intestine, called the duodenum.  After you have an endoscopy, you will stay at the hospital or clinic for 1 to 2 hours. This will allow the medicine to wear off. You will be able to go home after your doctor or nurse checks to make sure that you're not having any problems.  You may have to stay overnight if you had treatment during the test. You may have a sore throat for a day or two after the test.  This care sheet gives you a general idea about what to expect after the test.  How can you care for yourself at home?  Activity   Rest as much as you need to after you go home.  You should be able to go back to your usual activities the day after the test.  Diet   Follow your doctor's directions for eating after the test.  Drink plenty of fluids (unless your doctor has told you not to).  Medications   If you have a sore throat the day after the test, use an over-the-counter spray to numb your throat.  Follow-up care is a key part of your treatment and safety. Be sure to make and go to all appointments, and call your doctor if you are having problems. It's also a good idea to know your test results and keep a list of the medicines you take.  When should you call for help?   Call 116  "anytime you think you may need emergency care. For example, call if:    You passed out (lost consciousness).     You have trouble breathing.     You pass maroon or bloody stools.   Call your doctor now or seek immediate medical care if:    You have pain that does not get better after your take pain medicine.     You have new or worse belly pain.     You have blood in your stools.     You are sick to your stomach and cannot keep fluids down.     You have a fever.     You cannot pass stools or gas.   Watch closely for changes in your health, and be sure to contact your doctor if:    Your throat still hurts after a day or two.     You do not get better as expected.   Where can you learn more?  Go to https://www.[employer].net/patiented  Enter J454 in the search box to learn more about \"Upper GI Endoscopy: What to Expect at Home.\"  Current as of: October 19, 2023  Content Version: 14.3    2024 [employer]   Care instructions adapted under license by your healthcare professional. If you have questions about a medical condition or this instruction, always ask your healthcare professional. [employer] disclaims any warranty or liability for your use of this information.  Colonoscopy: What to Expect at Home  Your Recovery  After a colonoscopy, you'll stay at the clinic until you wake up. Then you can go home. But you'll need to arrange for a ride. Your doctor will tell you when you can eat and do your other usual activities.  Your doctor will talk to you about when you'll need your next colonoscopy. Your doctor can help you decide how often you need to be checked. This will depend on the results of your test and your risk for colorectal cancer.  After the test, you may be bloated or have gas pains. You may need to pass gas. If a biopsy was done or a polyp was removed, you may have streaks of blood in your stool (feces) for a few days. Problems such as heavy rectal bleeding may not occur until several " weeks after the test. This isn't common. But it can happen after polyps are removed.  This care sheet gives you a general idea about how long it will take for you to recover. But each person recovers at a different pace. Follow the steps below to get better as quickly as possible.  How can you care for yourself at home?  Activity    Rest when you feel tired.     You can do your normal activities when it feels okay to do so.   Diet    Follow your doctor's directions for eating.     Unless your doctor has told you not to, drink plenty of fluids. This helps to replace the fluids that were lost during the colon prep.     Do not drink alcohol.   Medicines    Your doctor will tell you if and when you can restart your medicines. You will also be given instructions about taking any new medicines.     If you stopped taking aspirin or some other blood thinner, your doctor will tell you when to start taking it again.     If polyps were removed or a biopsy was done during the test, your doctor may tell you not to take aspirin or other anti-inflammatory medicines for a few days. These include ibuprofen (Advil, Motrin) and naproxen (Aleve).   Other instructions    For your safety, do not drive or operate machinery until the medicine wears off and you can think clearly. Your doctor may tell you not to drive or operate machinery until the day after your test.     Do not sign legal documents or make major decisions until the medicine wears off and you can think clearly. The anesthesia can make it hard for you to fully understand what you are agreeing to.   Follow-up care is a key part of your treatment and safety. Be sure to make and go to all appointments, and call your doctor if you are having problems. It's also a good idea to know your test results and keep a list of the medicines you take.  When should you call for help?   Call 911 anytime you think you may need emergency care. For example, call if:    You passed out (lost  "consciousness).     You pass maroon or bloody stools.     You have trouble breathing.   Call your doctor now or seek immediate medical care if:    You have pain that does not get better after you take pain medicine.     You are sick to your stomach or cannot drink fluids.     You have new or worse belly pain.     You have blood in your stools.     You have a fever.     You cannot pass stools or gas.   Watch closely for changes in your health, and be sure to contact your doctor if you have any problems.  Where can you learn more?  Go to https://www.[employer].net/patiented  Enter E264 in the search box to learn more about \"Colonoscopy: What to Expect at Home.\"  Current as of: October 25, 2023  Content Version: 14.3    2024 [employer].   Care instructions adapted under license by your healthcare professional. If you have questions about a medical condition or this instruction, always ask your healthcare professional. [employer] disclaims any warranty or liability for your use of this information.      "

## 2025-02-14 NOTE — OR NURSING
Patient and responsible adult given discharge instructions with no questions regarding instructions. Kumar score 20/20. Pain level 0/10.  Discharged from unit via ambulatory. Patient discharged to home.

## 2025-02-14 NOTE — OP NOTE
Vinny Hsu MRN# [id number]   YOB: 1965 Age: 59 year old      Date of Admission:  2/14/2025  Date of Service:   2/14/25    Primary care provider: Dillon Simeon    PREOPERATIVE DIAGNOSIS:  Positive Fit, dysphagia        POSTOPERATIVE DIAGNOSIS:  Duodenitis, gastritis, colon polyps x 9         PROCEDURE:  Colonoscopy with polypectomy.            INDICATIONS:  See clinic notes.     Specimen:   ID Type Source Tests Collected by Time Destination   1 :  Biopsy Small Intestine, Duodenum SURGICAL PATHOLOGY EXAM Stehpan Francisco MD 2/14/2025 12:14 PM    2 :  Biopsy Stomach, Antrum SURGICAL PATHOLOGY EXAM Stephan Francisco MD 2/14/2025 12:14 PM    3 :  Biopsy Gastric Esophageal Junction SURGICAL PATHOLOGY EXAM Stephan Francisco MD 2/14/2025 12:16 PM    4 :  Polyp Large Intestine, Colon, Cecum SURGICAL PATHOLOGY EXAM Stephan Francisco MD 2/14/2025 12:28 PM    5 : x2 Polyp Large Intestine, Colon, Ascending SURGICAL PATHOLOGY EXAM Stephan Francisco MD 2/14/2025 12:31 PM    6 : x2 Polyp Large Intestine, Colon, Hepatic Flexure SURGICAL PATHOLOGY EXAM Stephan Francisco MD 2/14/2025 12:32 PM    7 : 60 cm Polyp Large Intestine, Colon SURGICAL PATHOLOGY EXAM Stephan Francisco MD 2/14/2025 12:38 PM    8 : 20 cm x3 Polyp Large Intestine, Colon SURGICAL PATHOLOGY EXAM Stephan Francisco MD 2/14/2025 12:44 PM        SURGEON: Stephan Francisco MD        PROCEDURE:  With the patient in the supine position on the transport cart, IV sedation was administered by the nurse anesthetist.  The patient's correct identity and planned procedure were confirmed during a requisite timeout pause.  A bite block was placed and the fiberoptic endoscope was introduced and negotiated through the cricopharyngeus without difficulty.  The length of the esophagus was examined without findings.  The gastroesophageal junction was noted 40 cm from the incisors; the Z line was regular without ulceration, bleeding source or stricture.  There was no   evidence of Taylor's change.  The stomach was entered and the pylorus was traversed easily.  The gastric mucosa was noted to have irritation in the antrum; there was no evidence of gastric polyp,   The duodenum was noted to have diffuse duodenitis this was biopsied. The endoscope was returned to the body of the stomach and retroflex confirmed the absence of abnormality in the cardia.      Random cold forceps biopsies were obtained of the antrum for H. pylori.  Hemostasis was judged adequate on visualization.   The endoscope was returned to the GE junction. A biopsy of some hypertrophied tissue was taken.  A second circumferential examination of the esophagus was performed on slow withdrawal of the endoscope without additional finding.        Our attention then turned to the colonoscopy, the external anus was inspected and was normal . Digital rectal exam was normal. The colonoscope was inserted and advanced under direct visualization to the level of the cecum which was identified by the appendiceal orifice and the ileocecal valve. The prep was excellent.. Upon slow withdrawal of the colonoscope, approximately 95% of the mucosa was directly visualized. There were multiple polyps noted throughout the colon all were less than 1 cm except for one at 20 cm, they were all removed combination of biopsy forceps and cold snare. The larger polyp was on a stalk that was then clipped for hemostasis. The rest of the colon was without mucosal abnormality. There was no evidence of further polyps, inflammation, bleeding or AVMs. Retroflexion of the rectum was normal. The extra air was removed from the colon, and the colonoscope withdrawn. The patient tolerated the procedure well and was taken to postanesthesia care unit.     We invite the patient to return in 3 years for follow up screening evaluation, pending pathology related to polyps.    Stephan Francisco MD

## 2025-02-14 NOTE — H&P
Surgery Consult Clinic Note      RE: Vinny Hsu  : 1965      Chief Complaint:  +Fit test   Odynophagia    History of Present Illness:  Dr. Francisco originally saw Mr. Hsu on 1/15/2025 for evaluation regarding esophagogastroduodenoscopy and colonoscopy.  This is his first colonoscopy.  He reports blood with wiping occasionally, recent +Fit test.  He also reports painful swallowing.  Please refer to that note for further detail.  He is here today to update his H&P.  He has no questions regarding  bowel prep.  Reports passing clear liquid stools today.  He specifically denies fevers, chills, nausea, vomiting, chest pain, shortness of breath, palpitations, sore throat, cough, or generalized feeling ill.      Medical history:  Past Medical History:   Diagnosis Date    Anxiety     BPH (benign prostatic hyperplasia)     Chronic mental illness     Chronic post-traumatic stress disorder (PTSD)     COPD (chronic obstructive pulmonary disease) ([facility])     Depressive disorder     Hyperlipemia     Thyroid disease     Tourettes syndrome        Surgical history:  Past Surgical History:   Procedure Laterality Date    DENTAL SURGERY Bilateral     ALL UPPER TEETH GONE    HC REPAIR HERNIA WITH MESH      LAPAROSCOPIC HERNIORRHAPHY INGUINAL Bilateral 2017    Procedure: LAPAROSCOPIC HERNIORRHAPHY INGUINAL;  LAPAROSCOPIC REPAIR BILATERAL INGUINAL HERNIA'S;  Surgeon: Bridget Hemphill MD;  Location: [facility] OR       Family history:  Family History   Problem Relation Age of Onset    Other Cancer Mother     Coronary Artery Disease Paternal Grandfather        Medications:  Prior to Admission medications    Medication Sig Start Date End Date Taking? Authorizing Provider   atorvastatin (LIPITOR) 20 MG tablet Take 1 tablet (20 mg) by mouth daily 6/15/17  Yes Elijah Villaseñor DO   buPROPion (WELLBUTRIN XL) 150 MG 24 hr tablet Take 1 tablet (150 mg) by mouth daily 6/15/17  Yes Elijah Villaseñor DO   BusPIRone HCl 30  MG TABS Take 30 mg by mouth 2 times daily 6/15/17  Yes Elijah Villaseñor DO   fluticasone (FLONASE) 50 MCG/ACT nasal spray Spray 2 sprays into both nostrils daily 3/6/23  Yes Melody Loco PA-C   gabapentin (NEURONTIN) 300 MG capsule ONE CAP Q AM, TWO CAPS QHS 6/15/17  Yes Elijah Villaseñor DO   Levothyroxine Sodium 88 MCG CAPS Take 88 mcg by mouth daily 6/15/17  Yes Elijah Villaseñor DO   sertraline (ZOLOFT) 100 MG tablet Take 2 tablets (200 mg) by mouth daily 2/12/20  Yes Elijah Villaseñor DO   tamsulosin (FLOMAX) 0.4 MG capsule TAKE 1 CAPSULE BY MOUTH DAILY 11/29/21  Yes Jesusita Roldan APRN CNP   traZODone (DESYREL) 50 MG tablet Take 1 tablet (50 mg) by mouth At Bedtime MAY TAKE ADDITIONAL DOSE AS NEEDED 2/12/20  Yes Elijah Villaseñor DO     Allergies:  The patient is allergic to antihistamines, loratadine-type [antihistamines, loratadine-type]; escitalopram; tiotropium bromide monohydrate; venlafaxine; and keflet [cephalexin].  .  Social history:  Social History     Tobacco Use    Smoking status: Every Day     Types: Cigars     Start date: 1/1/1970    Smokeless tobacco: Never    Tobacco comments:     quit for 3 years   Substance Use Topics    Alcohol use: Yes     Comment: Beer daily     Marital status: .    Review of Systems:    Constitutional: Negative for fever, chills and weight loss.   HENT: Negative for ear pain, nosebleeds, congestion, sore throat, tinnitus and ear discharge.    Eyes: Negative for blurred vision, double vision, photophobia and pain.   Respiratory: Negative for cough, hemoptysis, shortness of breath, wheezing and stridor.    Cardiovascular: Negative for chest pain, palpitations and orthopnea.   Gastrointestinal: Negative for heartburn, nausea, vomiting, abdominal pain and blood in stool.   Genitourinary: Negative for urgency, frequency and hematuria.   Musculoskeletal: Negative for myalgias, back pain and joint pain.   Neurological: Negative for  "tingling, speech change and headaches.   Endo/Heme/Allergies: Does not bruise/bleed easily.   Psychiatric/Behavioral: Negative for depression, suicidal ideas and hallucinations. The patient is not nervous/anxious.    Physical Examination:  BP (!) 142/95   Pulse 59   Temp 97.9  F (36.6  C) (Tympanic)   Resp 18   Ht 1.753 m (5' 9\")   Wt 70.7 kg (155 lb 12.8 oz)   SpO2 97%   BMI 23.01 kg/m    General: Alert and orientedx4, no acute distress, well-developed/well-nourished, ambulating without assistance  HEENT: normocephalic atraumatic, extraocular movements intact, PERRL Sclerae anicteric; Trachea midline  Chest:   Clear to auscultation bilaterally.  Cardiac: S1S2 , regular rate and rhythm without additional sounds  Abdomen: Soft, non-tender, non-distended  Extremities: Cursory exam unremarkable.  No peripheral edema noted.  Skin: Warm, dry, less than 2 sec cap refill  Neuro: CN 2-12 grossly intact, no focal deficit, GCS 15  Psych: Pleasant, calm, asks appropriate questions        Assessment/Plan:  #1 Colonoscopy  #2 Esophagogastroduodenoscopy  #3 Odynophagia  #4 +Fit, blood with wiping    The indications, risks, benefits and technical aspects of esophagogastroduodenoscopy were reviewed with him, his questions were asked and answered. Antral biopsy for histologic examination will be performed and the place of H. pylori in gastritis was discussed. Preoperative preparation, npo after midnight preceding the date was discussed and a request made to hold aspirin containing agents one week prior to ameliorate antiplatelet effect.      Vinny Hsu and ROBYN had a discussion about colonoscopies.  The indications, risks, benefits, althernatives and technical aspects of whole colon colonoscopy were outlined with risks including, but not limited to, perforation, bleeding and inability to visualize entire colon.  Management of each was reviewed including the risk for life saving surgery and possible admittance to the " [facility] [place].  The need of mechanical preparation of the colon was reviewed along with the use of monitored anesthetic care.  His questions were asked and answered.    We will proceed with esophagogastroduodenoscopy and colonoscopy as scheduled with Dr. Francisco.  Dulce Baker [facility] and [place] [id number]    Referring Provider:  No referring provider defined for this encounter.     Primary Care Provider:  Dillon Simeon

## 2025-02-18 LAB
PATH REPORT.COMMENTS IMP SPEC: NORMAL
PATH REPORT.FINAL DX SPEC: NORMAL
PATH REPORT.GROSS SPEC: NORMAL
PATH REPORT.MICROSCOPIC SPEC OTHER STN: NORMAL
PATH REPORT.RELEVANT HX SPEC: NORMAL
PHOTO IMAGE: NORMAL

## 2025-06-24 DIAGNOSIS — K29.00 ACUTE GASTRITIS, PRESENCE OF BLEEDING UNSPECIFIED, UNSPECIFIED GASTRITIS TYPE: ICD-10-CM

## 2025-06-24 NOTE — TELEPHONE ENCOUNTER
Disp Refills Start End JAKUB   omeprazole (PRILOSEC) 40 MG DR capsule 30 capsule 0 5/16/2025 -- No     Last Office Visit: 01/15/2025  Future Office visit:       Routing refill request to provider for review/approval because:

## 2025-06-26 RX ORDER — OMEPRAZOLE 40 MG/1
40 CAPSULE, DELAYED RELEASE ORAL DAILY
Qty: 30 CAPSULE | Refills: 0 | Status: SHIPPED | OUTPATIENT
Start: 2025-06-26

## 2025-08-05 DIAGNOSIS — K29.00 ACUTE GASTRITIS, PRESENCE OF BLEEDING UNSPECIFIED, UNSPECIFIED GASTRITIS TYPE: ICD-10-CM

## 2025-08-07 RX ORDER — OMEPRAZOLE 40 MG/1
40 CAPSULE, DELAYED RELEASE ORAL DAILY
Qty: 30 CAPSULE | Refills: 0 | Status: SHIPPED | OUTPATIENT
Start: 2025-08-07

## (undated) DEVICE — FORCEPS BIOPSY RADIAL JAW 4 LARGE W/NEEDLE 240CM M00513332

## (undated) DEVICE — CATH TRAY-16FR METER W/STATLOCK LATEX]

## (undated) DEVICE — CAUTERY PAD-POLYHESIVE II ADULT

## (undated) DEVICE — SOL WATER IRRIG 1000ML BOTTLE 2F7114

## (undated) DEVICE — LABEL-STERILE PREPRINTED FOR OR

## (undated) DEVICE — KII DISSECTING BALLOON ACCESS SYSTEM-OVAL

## (undated) DEVICE — SUTURE-VICRYL 3-0 SH J416H

## (undated) DEVICE — TUBE-SALEM SUMP 18FR STOMACH SUCTION

## (undated) DEVICE — CLIP-RESOLUTION CLIPPING DEVICE

## (undated) DEVICE — TUBING-SUCTION 20FT

## (undated) DEVICE — TUBING SUCTION 20FT N620A

## (undated) DEVICE — CONNECTOR ERBEFLO 2 PORT 20325-215

## (undated) DEVICE — SUCTION MANIFOLD NEPTUNE 2 SYS 1 PORT 702-025-000

## (undated) DEVICE — IRRIGATION-NACL 1000ML

## (undated) DEVICE — GOWN-SURG LG LVL 3 REINFORCED

## (undated) DEVICE — IRRIGATION-H2O 1000ML

## (undated) DEVICE — NDL-21G 1-1/2" NON-SAFETY

## (undated) DEVICE — WANDS-INSULSCAN

## (undated) DEVICE — GLV-7.0 PROTEXIS PI CLASSIC LF/PF

## (undated) DEVICE — SUTURE-PDS 1 CTXB ZB371

## (undated) DEVICE — SUTURE-VICRYL 2-0 SH J417H

## (undated) DEVICE — LIGHT HANDLE COVER

## (undated) DEVICE — GOWN-SURG XL LVL 3 REINFORCED

## (undated) DEVICE — TUBING-INSUFFLATION/LAPAROFLATOR W/FILTER

## (undated) DEVICE — CANISTER-SUCTION 2000CC

## (undated) DEVICE — PACK-LAPAROTOMY-CUSTOM

## (undated) DEVICE — BDG-STAT STRIP

## (undated) DEVICE — APPLICATOR-CHLORAPREP 26ML TINTED CHG 2%+ 70% IPA-SURGICAL

## (undated) DEVICE — SYRINGE-20CC LUER LOCK

## (undated) DEVICE — ANTI-FOG AGENT

## (undated) DEVICE — BLADE-SCALPEL #15

## (undated) DEVICE — SCD SLEEVE-KNEE REG.

## (undated) DEVICE — BIN-COVIDIEN MESH BIN

## (undated) DEVICE — ENDO SNARE EXACTO COLD 9MM LOOP 2.4MMX230CM 00711115

## (undated) DEVICE — CANISTER SUCTION MEDI-VAC GUARDIAN 2000ML 90D 65651-220

## (undated) DEVICE — ENDO TRAP POLYP E-TRAP 00711099

## (undated) DEVICE — TOWEL-OR DISP 4PKS

## (undated) RX ORDER — PROPOFOL 10 MG/ML
INJECTION, EMULSION INTRAVENOUS
Status: DISPENSED
Start: 2017-07-13

## (undated) RX ORDER — ONDANSETRON 2 MG/ML
INJECTION INTRAMUSCULAR; INTRAVENOUS
Status: DISPENSED
Start: 2017-07-13

## (undated) RX ORDER — GLYCOPYRROLATE 0.2 MG/ML
INJECTION, SOLUTION INTRAMUSCULAR; INTRAVENOUS
Status: DISPENSED
Start: 2017-07-13

## (undated) RX ORDER — LIDOCAINE HYDROCHLORIDE 20 MG/ML
INJECTION, SOLUTION EPIDURAL; INFILTRATION; INTRACAUDAL; PERINEURAL
Status: DISPENSED
Start: 2017-07-13

## (undated) RX ORDER — FENTANYL CITRATE 50 UG/ML
INJECTION, SOLUTION INTRAMUSCULAR; INTRAVENOUS
Status: DISPENSED
Start: 2017-07-13

## (undated) RX ORDER — EPHEDRINE SULFATE 50 MG/ML
INJECTION, SOLUTION INTRAMUSCULAR; INTRAVENOUS; SUBCUTANEOUS
Status: DISPENSED
Start: 2017-07-13

## (undated) RX ORDER — NEOSTIGMINE METHYLSULFATE 5 MG/5 ML
SYRINGE (ML) INTRAVENOUS
Status: DISPENSED
Start: 2017-07-13